# Patient Record
Sex: FEMALE | Race: WHITE | Employment: OTHER | URBAN - METROPOLITAN AREA
[De-identification: names, ages, dates, MRNs, and addresses within clinical notes are randomized per-mention and may not be internally consistent; named-entity substitution may affect disease eponyms.]

---

## 2018-01-17 PROBLEM — R06.00 DYSPNEA: Status: ACTIVE | Noted: 2018-01-17

## 2018-03-15 ENCOUNTER — TELEPHONE (OUTPATIENT)
Dept: CARDIOLOGY CLINIC | Age: 78
End: 2018-03-15

## 2018-03-15 NOTE — TELEPHONE ENCOUNTER
I informed pt. That we don't have any ranexa samples at this time and we don't know when we will be getting some, pt.  Said she will call again in a week

## 2018-03-22 ENCOUNTER — TELEPHONE (OUTPATIENT)
Dept: CARDIOLOGY CLINIC | Age: 78
End: 2018-03-22

## 2018-03-22 RX ORDER — RANOLAZINE 500 MG/1
500 TABLET, EXTENDED RELEASE ORAL 2 TIMES DAILY
Qty: 56 TABLET | Refills: 3 | COMMUNITY
Start: 2018-03-22 | End: 2018-09-07 | Stop reason: SDUPTHER

## 2018-04-13 ENCOUNTER — OFFICE VISIT (OUTPATIENT)
Dept: CARDIOLOGY CLINIC | Age: 78
End: 2018-04-13
Payer: MEDICARE

## 2018-04-13 VITALS
HEART RATE: 51 BPM | RESPIRATION RATE: 14 BRPM | WEIGHT: 140 LBS | BODY MASS INDEX: 27.48 KG/M2 | HEIGHT: 60 IN | SYSTOLIC BLOOD PRESSURE: 128 MMHG | DIASTOLIC BLOOD PRESSURE: 68 MMHG

## 2018-04-13 DIAGNOSIS — I25.10 CORONARY ARTERY DISEASE INVOLVING NATIVE CORONARY ARTERY OF NATIVE HEART WITHOUT ANGINA PECTORIS: Primary | Chronic | ICD-10-CM

## 2018-04-13 PROCEDURE — 93000 ELECTROCARDIOGRAM COMPLETE: CPT | Performed by: INTERNAL MEDICINE

## 2018-04-13 PROCEDURE — 99213 OFFICE O/P EST LOW 20 MIN: CPT | Performed by: INTERNAL MEDICINE

## 2018-04-13 RX ORDER — POTASSIUM CHLORIDE 750 MG/1
20 TABLET, FILM COATED, EXTENDED RELEASE ORAL DAILY
COMMUNITY
End: 2019-05-31

## 2018-04-13 RX ORDER — POTASSIUM CHLORIDE 20 MEQ/1
TABLET, EXTENDED RELEASE ORAL
Qty: 30 TABLET | Refills: 3 | Status: SHIPPED | OUTPATIENT
Start: 2018-04-13 | End: 2018-04-13

## 2018-04-13 RX ORDER — FUROSEMIDE 20 MG/1
TABLET ORAL
Qty: 60 TABLET | Refills: 3 | Status: SHIPPED | OUTPATIENT
Start: 2018-04-13 | End: 2019-01-14

## 2018-04-20 ENCOUNTER — TELEPHONE (OUTPATIENT)
Dept: CARDIOLOGY CLINIC | Age: 78
End: 2018-04-20

## 2018-04-20 ENCOUNTER — HOSPITAL ENCOUNTER (OUTPATIENT)
Age: 78
Discharge: HOME | End: 2018-04-20
Payer: MEDICARE

## 2018-04-20 DIAGNOSIS — I25.10 CORONARY ARTERY DISEASE INVOLVING NATIVE CORONARY ARTERY OF NATIVE HEART WITHOUT ANGINA PECTORIS: Chronic | ICD-10-CM

## 2018-04-20 LAB
ANION GAP SERPL CALCULATED.3IONS-SCNC: 12 MMOL/L (ref 7–16)
BUN BLDV-MCNC: 17 MG/DL (ref 8–23)
CALCIUM SERPL-MCNC: 9.2 MG/DL (ref 8.6–10.2)
CHLORIDE BLD-SCNC: 104 MMOL/L (ref 98–107)
CO2: 25 MMOL/L (ref 22–29)
CREAT SERPL-MCNC: 0.9 MG/DL (ref 0.5–1)
GFR AFRICAN AMERICAN: >60
GFR NON-AFRICAN AMERICAN: >60 ML/MIN/1.73
GLUCOSE BLD-MCNC: 118 MG/DL (ref 74–109)
POTASSIUM SERPL-SCNC: 4 MMOL/L (ref 3.5–5)
SODIUM BLD-SCNC: 141 MMOL/L (ref 132–146)

## 2018-04-20 PROCEDURE — 80048 BASIC METABOLIC PNL TOTAL CA: CPT

## 2018-04-20 PROCEDURE — 36415 COLL VENOUS BLD VENIPUNCTURE: CPT

## 2018-04-20 RX ORDER — RANOLAZINE 500 MG/1
500 TABLET, EXTENDED RELEASE ORAL 2 TIMES DAILY
Qty: 42 TABLET | Refills: 0 | COMMUNITY
Start: 2018-04-20 | End: 2018-09-07 | Stop reason: SDUPTHER

## 2018-04-25 ENCOUNTER — OFFICE VISIT (OUTPATIENT)
Dept: SURGERY | Age: 78
End: 2018-04-25
Payer: MEDICARE

## 2018-04-25 VITALS
TEMPERATURE: 97.7 F | RESPIRATION RATE: 16 BRPM | WEIGHT: 139 LBS | HEART RATE: 54 BPM | BODY MASS INDEX: 27.29 KG/M2 | OXYGEN SATURATION: 96 % | HEIGHT: 60 IN | DIASTOLIC BLOOD PRESSURE: 64 MMHG | SYSTOLIC BLOOD PRESSURE: 119 MMHG

## 2018-04-25 DIAGNOSIS — K58.0 IRRITABLE BOWEL SYNDROME WITH DIARRHEA: Primary | ICD-10-CM

## 2018-04-25 PROCEDURE — 99214 OFFICE O/P EST MOD 30 MIN: CPT | Performed by: SURGERY

## 2018-04-25 RX ORDER — DICYCLOMINE HYDROCHLORIDE 10 MG/1
10 CAPSULE ORAL 4 TIMES DAILY
Qty: 120 CAPSULE | Refills: 3 | Status: SHIPPED | OUTPATIENT
Start: 2018-04-25 | End: 2018-08-16 | Stop reason: ALTCHOICE

## 2018-05-14 ENCOUNTER — TELEPHONE (OUTPATIENT)
Dept: CARDIOLOGY CLINIC | Age: 78
End: 2018-05-14

## 2018-05-14 RX ORDER — RANOLAZINE 500 MG/1
500 TABLET, EXTENDED RELEASE ORAL 2 TIMES DAILY
Qty: 56 TABLET | Refills: 0 | COMMUNITY
Start: 2018-05-14 | End: 2018-09-07 | Stop reason: SDUPTHER

## 2018-05-14 RX ORDER — CLOPIDOGREL BISULFATE 75 MG/1
75 TABLET ORAL DAILY
Qty: 90 TABLET | Refills: 3 | Status: SHIPPED | OUTPATIENT
Start: 2018-05-14 | End: 2018-09-07 | Stop reason: ALTCHOICE

## 2018-06-14 ENCOUNTER — TELEPHONE (OUTPATIENT)
Dept: CARDIOLOGY CLINIC | Age: 78
End: 2018-06-14

## 2018-06-14 RX ORDER — RANOLAZINE 500 MG/1
500 TABLET, EXTENDED RELEASE ORAL 2 TIMES DAILY
Qty: 28 TABLET | Refills: 0 | COMMUNITY
Start: 2018-06-14 | End: 2018-09-07 | Stop reason: SDUPTHER

## 2018-07-03 ENCOUNTER — TELEPHONE (OUTPATIENT)
Dept: ADMINISTRATIVE | Age: 78
End: 2018-07-03

## 2018-07-03 ENCOUNTER — TELEPHONE (OUTPATIENT)
Dept: CARDIOLOGY CLINIC | Age: 78
End: 2018-07-03

## 2018-07-03 RX ORDER — AMLODIPINE BESYLATE 5 MG/1
5 TABLET ORAL NIGHTLY
Qty: 90 TABLET | Refills: 3 | Status: SHIPPED | OUTPATIENT
Start: 2018-07-03 | End: 2019-06-18 | Stop reason: SDUPTHER

## 2018-07-03 RX ORDER — RANOLAZINE 500 MG/1
500 TABLET, EXTENDED RELEASE ORAL 2 TIMES DAILY
Qty: 28 TABLET | Refills: 0 | COMMUNITY
Start: 2018-07-03 | End: 2018-09-07 | Stop reason: SDUPTHER

## 2018-07-19 ENCOUNTER — TELEPHONE (OUTPATIENT)
Dept: CARDIOLOGY CLINIC | Age: 78
End: 2018-07-19

## 2018-07-20 RX ORDER — RANOLAZINE 500 MG/1
500 TABLET, EXTENDED RELEASE ORAL 2 TIMES DAILY
Qty: 42 TABLET | Refills: 0 | COMMUNITY
Start: 2018-07-20 | End: 2018-09-07 | Stop reason: SDUPTHER

## 2018-07-31 ENCOUNTER — OFFICE VISIT (OUTPATIENT)
Dept: ENT CLINIC | Age: 78
End: 2018-07-31
Payer: MEDICARE

## 2018-07-31 VITALS
BODY MASS INDEX: 25.91 KG/M2 | HEIGHT: 60 IN | WEIGHT: 132 LBS | DIASTOLIC BLOOD PRESSURE: 57 MMHG | SYSTOLIC BLOOD PRESSURE: 114 MMHG | HEART RATE: 50 BPM | OXYGEN SATURATION: 96 %

## 2018-07-31 DIAGNOSIS — J30.1 CHRONIC SEASONAL ALLERGIC RHINITIS DUE TO POLLEN: ICD-10-CM

## 2018-07-31 DIAGNOSIS — J32.4 CHRONIC PANSINUSITIS: Primary | ICD-10-CM

## 2018-07-31 PROCEDURE — 99204 OFFICE O/P NEW MOD 45 MIN: CPT | Performed by: OTOLARYNGOLOGY

## 2018-07-31 ASSESSMENT — ENCOUNTER SYMPTOMS
EYE DISCHARGE: 0
EYE PAIN: 1
ABDOMINAL PAIN: 0
EYE ITCHING: 0
SINUS PAIN: 1
RHINORRHEA: 1
COLOR CHANGE: 0
RESPIRATORY NEGATIVE: 1
FACIAL SWELLING: 1
VOICE CHANGE: 1
SHORTNESS OF BREATH: 0
GASTROINTESTINAL NEGATIVE: 1
SINUS PRESSURE: 1

## 2018-07-31 NOTE — PROGRESS NOTES
Subjective:      Patient ID:  Lisha Garcia is a 77 y.o. female. HPI:    Patient presents today for \"lifelong green mucus\" that she is constantly clearing from her thoat. She had Left sided sinus surgery in 2014, which had no effect on her condition. Since that procedure she has daily Left sided epiphora that she is concerned about. She complains of decreased sense of smell. She is using a sinus flush BID, which temporarily helps her PND. Her left sided face pressure and fullness is worsening and she now hears her left ear popping. She feels that she has visible face fullness on the left at times. She very inconsistently uses Flonase. She has b/l tinnitus that she wears masking hearing aids for. She was a patient of Dr. Servando Ku, but desires to now establish care elsewhere. Patient's medications, allergies, past medical, surgical, social and family histories were reviewed and updated as appropriate. Review of Systems   Constitutional: Negative. Negative for fatigue, fever and unexpected weight change. HENT: Positive for congestion, ear pain, facial swelling, postnasal drip, rhinorrhea, sinus pain, sinus pressure, tinnitus and voice change. Negative for ear discharge. Eyes: Positive for pain. Negative for discharge, itching and visual disturbance. Respiratory: Negative. Negative for shortness of breath. Cardiovascular: Negative. Negative for chest pain. Gastrointestinal: Negative. Negative for abdominal pain. Genitourinary: Negative. Musculoskeletal: Negative. Skin: Negative. Negative for color change. Neurological: Negative. Psychiatric/Behavioral: Negative. Negative for behavioral problems and hallucinations. All other systems reviewed and are negative. Objective:   Physical Exam   Constitutional: She is oriented to person, place, and time. She appears well-developed and well-nourished. No distress. HENT:   Head: Normocephalic and atraumatic. Right Ear: Hearing, tympanic membrane, external ear and ear canal normal.   Left Ear: Hearing, tympanic membrane, external ear and ear canal normal.   Nose: Mucosal edema and rhinorrhea present. Mouth/Throat: Uvula is midline, oropharynx is clear and moist and mucous membranes are normal.   Mild PND w/o cobblestoning. Mild pale green congestion from middle turb. Septum midline. Mucosa pink and moist. Voice consistent with congestion. Eyes: Conjunctivae and EOM are normal. Pupils are equal, round, and reactive to light. Right eye exhibits no discharge. Left eye exhibits no discharge. Neck: Normal range of motion. Neck supple. No tracheal deviation present. No thyromegaly present. Cardiovascular: Normal rate, regular rhythm and normal heart sounds. Pulmonary/Chest: Effort normal and breath sounds normal.   Abdominal: Soft. Bowel sounds are normal.   Lymphadenopathy:     She has no cervical adenopathy. Neurological: She is alert and oriented to person, place, and time. Skin: Skin is warm and dry. Nursing note and vitals reviewed. Assessment:       Diagnosis Orders   1. Chronic pansinusitis     2. Chronic seasonal allergic rhinitis due to pollen                Plan:      Pt has been educated on flonase use. She is to use flonase daily two sprays each side before bed after she uses her sinus rinse. She does not need a script for flonase today. She is to follow up in one month; will consider CT sinuses w/o contrast and/or abx then if sxs persist.     1 month f/u    [facility] OMS-IV                     Fuad Castañeda  1940    I have discussed the case, including pertinent history and exam findings with the student. I have seen and examined the patient and the key elements of the encounter have been performed by me. I agree with the assessment, plan and orders as documented by the  student    Patient is here to establish care as a new patient in the clinic.            Remainder

## 2018-08-13 ENCOUNTER — TELEPHONE (OUTPATIENT)
Dept: CARDIOLOGY CLINIC | Age: 78
End: 2018-08-13

## 2018-08-13 RX ORDER — RANOLAZINE 500 MG/1
500 TABLET, EXTENDED RELEASE ORAL 2 TIMES DAILY
Qty: 56 TABLET | Refills: 0 | COMMUNITY
Start: 2018-08-13 | End: 2018-09-07 | Stop reason: SDUPTHER

## 2018-08-14 ENCOUNTER — HOSPITAL ENCOUNTER (OUTPATIENT)
Age: 78
Discharge: HOME | End: 2018-08-16
Payer: MEDICARE

## 2018-08-14 PROCEDURE — 87088 URINE BACTERIA CULTURE: CPT

## 2018-08-16 ENCOUNTER — OFFICE VISIT (OUTPATIENT)
Dept: PULMONOLOGY | Age: 78
End: 2018-08-16
Payer: MEDICARE

## 2018-08-16 VITALS
SYSTOLIC BLOOD PRESSURE: 129 MMHG | DIASTOLIC BLOOD PRESSURE: 63 MMHG | HEIGHT: 60 IN | HEART RATE: 55 BPM | BODY MASS INDEX: 26.5 KG/M2 | TEMPERATURE: 98.7 F | WEIGHT: 135 LBS

## 2018-08-16 DIAGNOSIS — R06.00 DYSPNEA, UNSPECIFIED TYPE: ICD-10-CM

## 2018-08-16 LAB — URINE CULTURE, ROUTINE: NORMAL

## 2018-08-16 PROCEDURE — 99213 OFFICE O/P EST LOW 20 MIN: CPT | Performed by: INTERNAL MEDICINE

## 2018-08-16 RX ORDER — NITROFURANTOIN 25; 75 MG/1; MG/1
CAPSULE ORAL
Refills: 0 | COMMUNITY
Start: 2018-08-14 | End: 2018-09-07

## 2018-08-16 NOTE — PROGRESS NOTES
Department of Internal Medicine                                    Division of Pulmonary, Critical Care & Sleep Medicine                                           Pulmonary [address] [facility]                       Pulmonary Clinic Consult     I had the pleasure of seeing  Judith Pruitt in the [facility] regarding their bronchiectasis and chronic sinusitis      Chief Complaint   Patient presents with    Follow-up     6 MONTHS,     Shortness of Breath    Cough    sinus drainage  Cough with green sputum    HISTORY OF PRESENT ILLNESS:    Judith Pruitt is a 77y.o. year old female extremely pleasant who started smoking at the age of 15 one pack daily for 30 year and then she quit. She had significant sinus problem for which she has been following follow-up last years and she had sinus surgery without significant improvement in her symptoms. 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 She was treated with IVIG by Dr. Luis Lopez and the last level still low also she had cultures of her sputum showing Pseudomonas and also she states she received IV abx  treatment for that. Her sinus surgery did not show any granuloma. She had also workup for autoimmune disease where she had been a negative rheumatoid factor negative with slight, Hypogammaglobulinemia, IgE level was normal and sed rate was 25 with CRP 5.5 . Patient was tried in tobramycin inhaler because she developed hoarseness after  the use. Seen by ENT without any significant issue in the vocal cord  She was also treated with Zithromax without any significant improvement by her family doctor.     The patient also uses Flonase for high sinusitis, seeing an daily tablet Take 1 tablet by mouth daily 90 tablet 3    nadolol (CORGARD) 80 MG tablet Take 1 tablet by mouth daily 90 tablet 3    albuterol sulfate HFA (PROAIR HFA) 108 (90 Base) MCG/ACT inhaler Inhale 2 puffs into the lungs every 6 hours as needed for Wheezing 1 Inhaler 3    nitroGLYCERIN (NITROSTAT) 0.4 MG SL tablet up to max of 3 total doses. If no relief after 1 dose, call 911. 25 tablet 3    Magnesium 100 MG CAPS Take 250 mg by mouth daily       rosuvastatin (CRESTOR) 5 MG tablet Take 5 mg by mouth       Lysine 1000 MG TABS Take by mouth daily      benzonatate (TESSALON) 100 MG capsule       hydrochlorothiazide (HYDRODIURIL) 12.5 MG tablet Take 12.5 mg by mouth daily       Multiple Vitamins-Minerals (THERAPEUTIC MULTIVITAMIN-MINERALS) tablet Take 1 tablet by mouth daily      Cholecalciferol (VITAMIN D) 2000 UNITS CAPS capsule Take 10,000 capsules by mouth daily       thyroid (ARMOUR THYROID) 60 MG tablet Take 60 mg by mouth daily.  Biotin 5000 MCG CAPS Take  by mouth daily.  Coenzyme Q10 (CO Q 10 PO) Take 200 mg by mouth daily.  OMEGA 3 1000 MG CAPS Take  by mouth daily.         nitrofurantoin, macrocrystal-monohydrate, (MACROBID) 100 MG capsule TAKE ONE CAPSULE BY MOUTH TWO TIMES A DAY  0    ranolazine (RANEXA) 500 MG extended release tablet Take 1 tablet by mouth 2 times daily for 1 day 56 tablet 0    ranolazine (RANEXA) 500 MG extended release tablet Take 1 tablet by mouth 2 times daily for 1 day [id number]/20 42 tablet 0    ranolazine (RANEXA) 500 MG extended release tablet Take 1 tablet by mouth 2 times daily for 1 day 28 tablet 0    ranolazine (RANEXA) 500 MG extended release tablet Take 1 tablet by mouth 2 times daily for 1 day 28 tablet 0    ranolazine (RANEXA) 500 MG extended release tablet Take 1 tablet by mouth 2 times daily for 1 day 56 tablet 0    ranolazine (RANEXA) 500 MG extended release tablet Take 1 tablet by mouth 2 times daily for 1 day 42 tablet 0    weight changes. No fevers, fatigue or weakness. Head: Patient denies any history of trauma, convulsive disorder or syncope. Skin:  Patient denies history of changes in pigmentation, eruptions or pruritus. No easy bruising or bleeding. EENT: nasal congestion ,severe erythema   Cardiovascular:   SOB ,No edema ,No chest pain   Respiration: SOB ,no ALVAREZ   Gastrointestinal:No GI bleed ,no melena  ,no hematemesis    Musculoskeletal: no joint pain ,no swelling  Neurological:no , syncope. Denies twitching, convulsions, loss of consciousness or memory. Endocrine:  . No history of goiter, exophthalmos or dryness of skin. The patient has no history of diabetes. Hematopoietic:  No history of bleeding disorders or easy bruising. Rheumatic:  No connective tissue disease history or polyarthritis/inflammatory joint disease. PHYSICAL EXAMINATION:  Vitals:    08/16/18 1314   BP: 129/63   Pulse: 55   Temp: 98.7 °F (37.1 °C)     Constitutional: This is a well developed, well nourished 77y.o. year old female who is alert, oriented, cooperative and in no apparent distress. Head was normocephalic and atraumatic. EENT: Eyes were equal and reactive to light and accommodation. Extraocular muscles intact. No conjunctival injections. External canals are patent and no discharge was appreciated. Septum was midline, mucosa was without erythema, exudates or cobblestoning. No thrush was noted. Neck: Supple without thyromegaly. No elevated JVP. Trachea was midline. No carotid bruits were auscultated. Respiratory: rhonchi and  Crackles bibasilar     Cardiovascular: Regular without murmur, clicks, gallops or rubs. There is no left or right ventricular heave. Pulses:  Carotid, radial and femoral pulses were equally bilaterally. Abdomen: Slightly rounded and soft without organomegaly. No rebound, rigidity or guarding was appreciated. Lymphatic: No lymphadenopathy.   Musculoskeletal: no Joint deformity

## 2018-09-07 ENCOUNTER — OFFICE VISIT (OUTPATIENT)
Dept: ENT CLINIC | Age: 78
End: 2018-09-07
Payer: MEDICARE

## 2018-09-07 VITALS
OXYGEN SATURATION: 90 % | DIASTOLIC BLOOD PRESSURE: 61 MMHG | HEIGHT: 60 IN | BODY MASS INDEX: 25.52 KG/M2 | WEIGHT: 130 LBS | HEART RATE: 47 BPM | SYSTOLIC BLOOD PRESSURE: 121 MMHG

## 2018-09-07 DIAGNOSIS — J30.1 CHRONIC SEASONAL ALLERGIC RHINITIS DUE TO POLLEN: ICD-10-CM

## 2018-09-07 DIAGNOSIS — J32.4 CHRONIC PANSINUSITIS: Primary | ICD-10-CM

## 2018-09-07 PROCEDURE — 99213 OFFICE O/P EST LOW 20 MIN: CPT | Performed by: OTOLARYNGOLOGY

## 2018-09-07 ASSESSMENT — ENCOUNTER SYMPTOMS
EYE ITCHING: 0
VOICE CHANGE: 1
ABDOMINAL PAIN: 0
SINUS PAIN: 1
SHORTNESS OF BREATH: 0
EYE DISCHARGE: 0
FACIAL SWELLING: 1
GASTROINTESTINAL NEGATIVE: 1
RHINORRHEA: 1
COLOR CHANGE: 0
RESPIRATORY NEGATIVE: 1
SINUS PRESSURE: 1
EYE PAIN: 1

## 2018-09-07 NOTE — PROGRESS NOTES
clear and moist and mucous membranes are normal.   Mild PND w/o cobblestoning. Mild pale green congestion from middle turb. Septum midline. Mucosa pink and moist. Voice consistent with congestion. Eyes: Pupils are equal, round, and reactive to light. Conjunctivae and EOM are normal. Right eye exhibits no discharge. Left eye exhibits no discharge. Neck: Normal range of motion. Neck supple. No tracheal deviation present. No thyromegaly present. Cardiovascular: Normal rate, regular rhythm and normal heart sounds. Pulmonary/Chest: Effort normal and breath sounds normal.   Abdominal: Soft. Bowel sounds are normal.   Lymphadenopathy:     She has no cervical adenopathy. Neurological: She is alert and oriented to person, place, and time. Skin: Skin is warm and dry. Nursing note and vitals reviewed. Assessment:       Diagnosis Orders   1. Chronic pansinusitis     2. Chronic seasonal allergic rhinitis due to pollen                Plan:      Allergic rhinitis   I do want to continue fluticasone (Flonase)    for now. I do not want the patient to try Mucinex as well, take two 600 mg pills twice a day. I will also order a CT scan of the Sinuses due to the continued symptoms of the patient despite medical therapy. Call or return to clinic prn if these symptoms worsen or fail to improve as anticipated.     Pt has had previous surgery by Dr. Servando Ku  Follow up in 2 week(s)

## 2018-09-10 ENCOUNTER — TELEPHONE (OUTPATIENT)
Dept: ADMINISTRATIVE | Age: 78
End: 2018-09-10

## 2018-09-10 ENCOUNTER — TELEPHONE (OUTPATIENT)
Dept: ENT CLINIC | Age: 78
End: 2018-09-10

## 2018-09-10 ENCOUNTER — TELEPHONE (OUTPATIENT)
Dept: CARDIOLOGY CLINIC | Age: 78
End: 2018-09-10

## 2018-09-10 RX ORDER — RANOLAZINE 500 MG/1
500 TABLET, EXTENDED RELEASE ORAL 2 TIMES DAILY
Qty: 56 TABLET | Refills: 0 | COMMUNITY
Start: 2018-09-10 | End: 2019-01-14

## 2018-09-10 NOTE — TELEPHONE ENCOUNTER
Pt called in response to vm that she received. She stated she did not get the whole message. I read her the message Milena Bennett typed in chart regarding sinus CT. Pt states she cannot do morning on that day but can do after 11 am.  I called the office but received the vm. Pt was transferred to leave a msg for the office to get back to her on resched CT.

## 2018-09-11 RX ORDER — NADOLOL 80 MG/1
80 TABLET ORAL DAILY
Qty: 90 TABLET | Refills: 3 | Status: SHIPPED | OUTPATIENT
Start: 2018-09-11 | End: 2018-09-11 | Stop reason: SDUPTHER

## 2018-09-12 RX ORDER — NADOLOL 80 MG/1
80 TABLET ORAL DAILY
Qty: 90 TABLET | Refills: 3 | Status: SHIPPED | OUTPATIENT
Start: 2018-09-12 | End: 2018-10-30 | Stop reason: ALTCHOICE

## 2018-09-20 ENCOUNTER — HOSPITAL ENCOUNTER (OUTPATIENT)
Dept: CT IMAGING | Age: 78
Discharge: HOME | End: 2018-09-22
Payer: MEDICARE

## 2018-09-20 DIAGNOSIS — J32.4 CHRONIC PANSINUSITIS: ICD-10-CM

## 2018-09-20 PROCEDURE — 70486 CT MAXILLOFACIAL W/O DYE: CPT

## 2018-09-28 ASSESSMENT — ENCOUNTER SYMPTOMS
EYE ITCHING: 0
CONSTIPATION: 0
ABDOMINAL PAIN: 0
SORE THROAT: 0
SHORTNESS OF BREATH: 0
VOICE CHANGE: 0
RHINORRHEA: 0
NAUSEA: 0
CHEST TIGHTNESS: 0
BACK PAIN: 0
EYE DISCHARGE: 0
COUGH: 0
SINUS PRESSURE: 0
BLOOD IN STOOL: 0
SINUS PAIN: 0
TROUBLE SWALLOWING: 0
DIARRHEA: 0
CHOKING: 0
ABDOMINAL DISTENTION: 0
VOMITING: 0
WHEEZING: 0

## 2018-10-08 ENCOUNTER — TELEPHONE (OUTPATIENT)
Dept: CARDIOLOGY CLINIC | Age: 78
End: 2018-10-08

## 2018-10-08 RX ORDER — RANOLAZINE 500 MG/1
500 TABLET, EXTENDED RELEASE ORAL 2 TIMES DAILY
Qty: 56 TABLET | Refills: 0 | COMMUNITY
Start: 2018-10-08 | End: 2019-01-14

## 2018-10-09 ENCOUNTER — OFFICE VISIT (OUTPATIENT)
Dept: ENT CLINIC | Age: 78
End: 2018-10-09
Payer: MEDICARE

## 2018-10-09 VITALS
DIASTOLIC BLOOD PRESSURE: 57 MMHG | HEIGHT: 60 IN | BODY MASS INDEX: 25.52 KG/M2 | WEIGHT: 130 LBS | SYSTOLIC BLOOD PRESSURE: 114 MMHG | OXYGEN SATURATION: 90 % | HEART RATE: 56 BPM

## 2018-10-09 DIAGNOSIS — J30.1 CHRONIC SEASONAL ALLERGIC RHINITIS DUE TO POLLEN: ICD-10-CM

## 2018-10-09 DIAGNOSIS — J32.4 CHRONIC PANSINUSITIS: Primary | ICD-10-CM

## 2018-10-09 PROCEDURE — 99213 OFFICE O/P EST LOW 20 MIN: CPT | Performed by: OTOLARYNGOLOGY

## 2018-10-09 RX ORDER — AZELASTINE 1 MG/ML
1 SPRAY, METERED NASAL 2 TIMES DAILY
Qty: 1 BOTTLE | Refills: 3 | Status: SHIPPED | OUTPATIENT
Start: 2018-10-09

## 2018-10-09 ASSESSMENT — ENCOUNTER SYMPTOMS
ALLERGIC/IMMUNOLOGIC NEGATIVE: 1
RHINORRHEA: 1
SHORTNESS OF BREATH: 0
EYE DISCHARGE: 1
COUGH: 1
SINUS PRESSURE: 1

## 2018-10-15 ENCOUNTER — OFFICE VISIT (OUTPATIENT)
Dept: BREAST CENTER | Age: 78
End: 2018-10-15
Payer: MEDICARE

## 2018-10-15 VITALS
HEART RATE: 54 BPM | DIASTOLIC BLOOD PRESSURE: 70 MMHG | TEMPERATURE: 98.2 F | BODY MASS INDEX: 26.25 KG/M2 | OXYGEN SATURATION: 95 % | WEIGHT: 133.7 LBS | SYSTOLIC BLOOD PRESSURE: 138 MMHG | RESPIRATION RATE: 16 BRPM | HEIGHT: 60 IN

## 2018-10-15 DIAGNOSIS — Z01.419 WELL WOMAN EXAM: ICD-10-CM

## 2018-10-15 DIAGNOSIS — Z12.31 VISIT FOR SCREENING MAMMOGRAM: Primary | ICD-10-CM

## 2018-10-15 DIAGNOSIS — N64.4 BREAST PAIN: ICD-10-CM

## 2018-10-15 PROCEDURE — 99203 OFFICE O/P NEW LOW 30 MIN: CPT | Performed by: NURSE PRACTITIONER

## 2018-10-15 PROCEDURE — 99204 OFFICE O/P NEW MOD 45 MIN: CPT | Performed by: NURSE PRACTITIONER

## 2018-10-18 ENCOUNTER — TELEPHONE (OUTPATIENT)
Dept: BREAST CENTER | Age: 78
End: 2018-10-18

## 2018-10-30 ENCOUNTER — OFFICE VISIT (OUTPATIENT)
Dept: CARDIOLOGY CLINIC | Age: 78
End: 2018-10-30
Payer: MEDICARE

## 2018-10-30 VITALS
BODY MASS INDEX: 25.9 KG/M2 | WEIGHT: 131.9 LBS | HEIGHT: 60 IN | HEART RATE: 45 BPM | RESPIRATION RATE: 16 BRPM | SYSTOLIC BLOOD PRESSURE: 122 MMHG | DIASTOLIC BLOOD PRESSURE: 62 MMHG

## 2018-10-30 DIAGNOSIS — I25.119 CORONARY ARTERY DISEASE INVOLVING NATIVE HEART WITH ANGINA PECTORIS, UNSPECIFIED VESSEL OR LESION TYPE (HCC): Primary | Chronic | ICD-10-CM

## 2018-10-30 PROCEDURE — 93000 ELECTROCARDIOGRAM COMPLETE: CPT | Performed by: INTERNAL MEDICINE

## 2018-10-30 PROCEDURE — 99214 OFFICE O/P EST MOD 30 MIN: CPT | Performed by: INTERNAL MEDICINE

## 2018-10-30 RX ORDER — ISOSORBIDE MONONITRATE 30 MG/1
30 TABLET, EXTENDED RELEASE ORAL DAILY
Qty: 90 TABLET | Refills: 3 | Status: SHIPPED | OUTPATIENT
Start: 2018-10-30 | End: 2018-11-29 | Stop reason: SDUPTHER

## 2018-10-30 RX ORDER — RANOLAZINE 500 MG/1
500 TABLET, EXTENDED RELEASE ORAL 2 TIMES DAILY
Qty: 56 TABLET | Refills: 0 | COMMUNITY
Start: 2018-10-30 | End: 2019-01-14

## 2018-10-30 RX ORDER — ASPIRIN 81 MG/1
81 TABLET ORAL DAILY
Qty: 30 TABLET | Refills: 3 | Status: SHIPPED | OUTPATIENT
Start: 2018-10-30

## 2018-10-30 RX ORDER — METOPROLOL SUCCINATE 25 MG/1
25 TABLET, EXTENDED RELEASE ORAL 2 TIMES DAILY
Qty: 60 TABLET | Refills: 3 | Status: SHIPPED | OUTPATIENT
Start: 2018-10-30 | End: 2018-11-06 | Stop reason: SINTOL

## 2018-10-30 NOTE — PROGRESS NOTES
(11/13/14)- saw [place] for yearly visit 3/2014    Cataract     bilaterally    Gastritis     GERD (gastroesophageal reflux disease)     Heart block 2004    2 heart stents inserted    Hyperlipidemia     denies     Prolonged emergence from general anesthesia     Thyroid disease     hypo    Vitamin D deficiency        Past Surgical History:  Past Surgical History:   Procedure Laterality Date    BRONCHOSCOPY N/A 11/18/14    BRONCHOSCOPY BAL & BRUSHING    CARDIAC CATHETERIZATION  2008    CATARACT REMOVAL Bilateral 2006    COLONOSCOPY  2006    neg    COLONOSCOPY  10/23/2015    CORONARY ANGIOPLASTY WITH STENT PLACEMENT  2004    2 stents    ENDOSCOPY, COLON, DIAGNOSTIC      ENDOSCOPY, COLON, DIAGNOSTIC  10/23/2015    HAND TENDON SURGERY  2003    left thumb    JOINT REPLACEMENT Right 2012    knee    KNEE ARTHROSCOPY  2005    right    KNEE SURGERY  5/14/2012    right knee replacement    NOSE SURGERY  2014    sinus    OTHER SURGICAL HISTORY Left     thumb arthritis surgery    UPPER GASTROINTESTINAL ENDOSCOPY  2011    neg       Family History:  Family History   Problem Relation Age of Onset    Cancer Paternal Grandfather         stomach    Diabetes Maternal Grandmother     Other Father         no clinical problems dies at age 80 years   [facility] Stroke Mother     Osteoporosis Mother     Alzheimer's Disease Brother         in nursing home       Social History:  Social History     Social History    Marital status:      Spouse name: N/A    Number of children: N/A    Years of education: N/A     Occupational History    Not on file. Social History Main Topics    Smoking status: Former Smoker     Packs/day: 1.00     Years: 25.00     Quit date: 10/19/1984    Smokeless tobacco: Never Used    Alcohol use No    Drug use: No    Sexual activity: Not Currently     Other Topics Concern    Not on file     Social History Narrative    No narrative on file       Allergies:   Allergies   Allergen

## 2018-11-06 ENCOUNTER — TELEPHONE (OUTPATIENT)
Dept: CARDIOLOGY CLINIC | Age: 78
End: 2018-11-06

## 2018-11-06 RX ORDER — NADOLOL 40 MG/1
40 TABLET ORAL DAILY
COMMUNITY
End: 2018-11-26 | Stop reason: SDUPTHER

## 2018-11-06 NOTE — TELEPHONE ENCOUNTER
Patient called stating her Nadolol was changed to Metoprolol and since then she has been experiencing angina, shortness of breath and she is completely exhausted. Please advise.

## 2018-11-07 ENCOUNTER — TELEPHONE (OUTPATIENT)
Dept: PULMONOLOGY | Age: 78
End: 2018-11-07

## 2018-11-07 NOTE — TELEPHONE ENCOUNTER
Returned patient's call. Patient left a VM during the lunch hour. Patient wanted to know if Dr. Panda Ramirez had an earlier appointment than her appointment of November 20th. Told patient that he had nothing sooner. She was wondering if anyone had cancelled. Told her sorry no.  Patient stated ok, no problem and that she will just see him on 11/20/18 at 1:15 PM.

## 2018-11-20 ENCOUNTER — TELEPHONE (OUTPATIENT)
Dept: PULMONOLOGY | Age: 78
End: 2018-11-20

## 2018-11-20 ENCOUNTER — OFFICE VISIT (OUTPATIENT)
Dept: PULMONOLOGY | Age: 78
End: 2018-11-20
Payer: MEDICARE

## 2018-11-20 VITALS
WEIGHT: 132 LBS | HEIGHT: 60 IN | SYSTOLIC BLOOD PRESSURE: 138 MMHG | BODY MASS INDEX: 25.91 KG/M2 | DIASTOLIC BLOOD PRESSURE: 64 MMHG | OXYGEN SATURATION: 83 % | HEART RATE: 50 BPM | TEMPERATURE: 97.7 F | RESPIRATION RATE: 20 BRPM

## 2018-11-20 DIAGNOSIS — R06.00 DYSPNEA, UNSPECIFIED TYPE: ICD-10-CM

## 2018-11-20 DIAGNOSIS — J47.1 BRONCHIECTASIS WITH ACUTE EXACERBATION (HCC): Primary | ICD-10-CM

## 2018-11-20 LAB
EXPIRATORY TIME: 7.01 SEC
FEF 25-75% %PRED-PRE: NORMAL L/SEC
FEF 25-75% PRED: 1.44 L/SEC
FEF 25-75%-PRE: 2.17 L/SEC
FEV1 %PRED-PRE: NORMAL %
FEV1 PRED: 1.7 L
FEV1/FVC %PRED-PRE: NORMAL %
FEV1/FVC PRED: 78 %
FEV1/FVC: 85 %
FEV1: 1.8 L
FVC %PRED-PRE: NORMAL %
FVC PRED: 2.21 L
FVC: 2.12 L
PEF %PRED-PRE: NORMAL L/SEC
PEF PRED: NORMAL L/SEC
PEF-PRE: NORMAL L/SEC

## 2018-11-20 PROCEDURE — 94010 BREATHING CAPACITY TEST: CPT | Performed by: INTERNAL MEDICINE

## 2018-11-20 PROCEDURE — 99214 OFFICE O/P EST MOD 30 MIN: CPT | Performed by: INTERNAL MEDICINE

## 2018-11-20 PROCEDURE — 99213 OFFICE O/P EST LOW 20 MIN: CPT | Performed by: INTERNAL MEDICINE

## 2018-11-20 RX ORDER — LEVOFLOXACIN 500 MG/1
500 TABLET, FILM COATED ORAL DAILY
Qty: 7 TABLET | Refills: 0 | Status: SHIPPED | OUTPATIENT
Start: 2018-11-20 | End: 2018-11-26 | Stop reason: ALTCHOICE

## 2018-11-20 RX ORDER — FLUCONAZOLE 100 MG/1
100 TABLET ORAL DAILY
Qty: 1 TABLET | Refills: 0 | Status: SHIPPED | OUTPATIENT
Start: 2018-11-20 | End: 2018-11-21

## 2018-11-20 RX ORDER — LEVOFLOXACIN 500 MG/1
500 TABLET, FILM COATED ORAL DAILY
Qty: 10 TABLET | Refills: 0 | Status: SHIPPED | OUTPATIENT
Start: 2018-11-20 | End: 2018-11-20 | Stop reason: SDUPTHER

## 2018-11-20 ASSESSMENT — PULMONARY FUNCTION TESTS
FVC: 2.12
FEV1: 1.80
FEV1/FVC: 85
FVC_PREDICTED: 2.21
FEV1/FVC_PREDICTED: 78
FEV1_PREDICTED: 1.70

## 2018-11-20 NOTE — PROGRESS NOTES
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   Birds :None     SURGICAL HISTORY:   Past Surgical History:   Procedure Laterality Date    BRONCHOSCOPY N/A 11/18/14    BRONCHOSCOPY BAL & BRUSHING    CARDIAC CATHETERIZATION  2008    CATARACT REMOVAL Bilateral 2006    COLONOSCOPY  2006    neg    COLONOSCOPY  10/23/2015    CORONARY ANGIOPLASTY WITH STENT PLACEMENT  2004    2 stents    ENDOSCOPY, COLON, DIAGNOSTIC      ENDOSCOPY, COLON, DIAGNOSTIC  10/23/2015    HAND TENDON SURGERY  2003    left thumb    JOINT REPLACEMENT Right 2012    knee    KNEE ARTHROSCOPY  2005    right    KNEE SURGERY  5/14/2012    right knee replacement    NOSE SURGERY  2014    sinus    OTHER SURGICAL HISTORY Left     thumb arthritis surgery    UPPER GASTROINTESTINAL ENDOSCOPY  2011    neg       FAMILY HISTORY:   Lung cancer:None  DVT or PE :None     REVIEW OF SYSTEMS:  Constitutional: General health is good . There has been no weight changes. No fevers, fatigue or weakness. Head: Patient denies any history of trauma, convulsive disorder or syncope. Skin:  Patient denies history of changes in pigmentation, eruptions or pruritus. No easy bruising or bleeding. EENT: nasal congestion ,severe erythema   Cardiovascular:   SOB ,No edema ,No chest pain   Respiration: SOB ,no ALVAREZ   Gastrointestinal:No GI bleed ,no melena  ,no hematemesis    Musculoskeletal: no joint pain ,no swelling  Neurological:no , syncope. Denies twitching, convulsions, loss of consciousness or memory. Endocrine:  . No history of goiter, exophthalmos or dryness of skin. The patient has no history of diabetes. Hematopoietic:  No history of bleeding disorders or easy bruising. Rheumatic:  No connective tissue disease history or polyarthritis/inflammatory joint disease.       PHYSICAL EXAMINATION:  Vitals: 11/20/18 1323   BP: 138/64   Pulse: 50   Resp: 20   Temp: 97.7 °F (36.5 °C)   SpO2: (!) 83%     Constitutional: This is a well developed, well nourished 77y.o. year old female who is alert, oriented, cooperative and in no apparent distress. Head was normocephalic and atraumatic. EENT: Eyes were equal and reactive to light and accommodation. Extraocular muscles intact. No conjunctival injections. External canals are patent and no discharge was appreciated. Septum was midline, mucosa was without erythema, exudates or cobblestoning. No thrush was noted. Neck: Supple without thyromegaly. No elevated JVP. Trachea was midline. No carotid bruits were auscultated. Respiratory: rhonchi and  Crackles bibasilar     Cardiovascular: Regular without murmur, clicks, gallops or rubs. There is no left or right ventricular heave. Pulses:  Carotid, radial and femoral pulses were equally bilaterally. Abdomen: Slightly rounded and soft without organomegaly. No rebound, rigidity or guarding was appreciated. Lymphatic: No lymphadenopathy. Musculoskeletal: no Joint deformity     Extremities: no edema . no cyanosis   Skin:  Warm and dry. Good color, turgor and pigmentation. No lesions or scars. Neurological/Psychiatric: The patient's general behavior, level of consciousness, thought content and emotional status is normal.  Cranial nerves II-XII are intact. DATA: Spirometry done in the office today demonstrates an FVC 2.47 of liters which is  110   % of predicted with an FEV1 of 1.92 liters which is111 % of predicted. FEV1/FVC ratio is 78  %. Mid expiratory flow rates are 1.71 % of predicted. Maximum voluntary ventilation is normal at 89  liters per minute or 121 % of predicted. Flow volume loop shows no signs of intrathoracic or extrathoracic obstruction.      Impressions: Normal spirometry    IMPRESSION:    1-Bronchiectasis  2-Severe allergic rhinitis with sinusitis  3- Hypogammaglobulinemia  4- Pseudomonas infection/colonization  5-hoarseness unknown etiology, was attributed to tobramycin inhaler use             PLAN:      -Sinusitis with  history of hypogammaglobulinemia   Does not want to undergo IVIG     3#advice patient to quit taking nonsteroidal anti-inflammatory drugs NSAIDs, also to advise to stop aspirin, she will discuss with her cardiologist    #had follow up with  ENT and she was told after treatment is surgery   #6 continue with flutter valve to help her clear her secretions   #-ANCA ngative   Allergy test negative   # send D dimer  #-send Sjogren      Levaquin 500 mg PO daily X 7 days  Then diflucan 100 mg Po daily X 1 after finish Levaquin ,not at the same   Azithromycin 250 Po every other day after finish X 4 weeks   Since she does not want IVIG and she has bronchiectasis and history of Pseudomonas < ihave no other choice by to ricki abx long and suppress the chronic inflammation and possible infection     Will start O2 on on ambulation   Her O2 drop to 84     Will do CT chest   Will see how she does next 4 weeks  No doubt challenging  case ,will consider [facility] if she agree and if still not better      Thank you for allowing me to participate in [place] care.  I will keep following with you ,should you have any concerns ,please contact me at [phone]     Sincerely,        Med Rodrigez MD  Pulmonary & Critical Care Medicine

## 2018-11-26 ENCOUNTER — TELEPHONE (OUTPATIENT)
Dept: PULMONOLOGY | Age: 78
End: 2018-11-26

## 2018-11-26 DIAGNOSIS — J47.9 BRONCHIECTASIS WITHOUT COMPLICATION (HCC): Primary | ICD-10-CM

## 2018-11-26 RX ORDER — FLUCONAZOLE 100 MG/1
TABLET ORAL
Qty: 1 TABLET | Refills: 0 | Status: SHIPPED | OUTPATIENT
Start: 2018-11-26 | End: 2018-12-07 | Stop reason: ALTCHOICE

## 2018-11-26 RX ORDER — AZITHROMYCIN 250 MG/1
TABLET, FILM COATED ORAL
Qty: 30 TABLET | Refills: 0 | Status: SHIPPED | OUTPATIENT
Start: 2018-11-26 | End: 2018-12-07 | Stop reason: ALTCHOICE

## 2018-11-26 RX ORDER — NADOLOL 40 MG/1
40 TABLET ORAL DAILY
Qty: 90 TABLET | Refills: 3 | Status: SHIPPED | OUTPATIENT
Start: 2018-11-26 | End: 2018-12-07 | Stop reason: SDUPTHER

## 2018-11-28 ENCOUNTER — HOSPITAL ENCOUNTER (OUTPATIENT)
Age: 78
Discharge: HOME | End: 2018-11-28
Payer: MEDICARE

## 2018-11-28 DIAGNOSIS — R06.00 DYSPNEA, UNSPECIFIED TYPE: ICD-10-CM

## 2018-11-28 LAB — D DIMER: 218 NG/ML DDU

## 2018-11-28 PROCEDURE — 86235 NUCLEAR ANTIGEN ANTIBODY: CPT

## 2018-11-28 PROCEDURE — 85378 FIBRIN DEGRADE SEMIQUANT: CPT

## 2018-11-28 PROCEDURE — 36415 COLL VENOUS BLD VENIPUNCTURE: CPT

## 2018-11-29 ENCOUNTER — TELEPHONE (OUTPATIENT)
Dept: CARDIOLOGY CLINIC | Age: 78
End: 2018-11-29

## 2018-11-29 RX ORDER — ISOSORBIDE MONONITRATE 30 MG/1
30 TABLET, EXTENDED RELEASE ORAL DAILY
Qty: 90 TABLET | Refills: 3 | Status: SHIPPED | OUTPATIENT
Start: 2018-11-29 | End: 2019-01-01 | Stop reason: SDUPTHER

## 2018-11-30 LAB
ENA TO SSA (RO) ANTIBODY: NEGATIVE
ENA TO SSB (LA) ANTIBODY: NEGATIVE

## 2018-12-03 ENCOUNTER — HOSPITAL ENCOUNTER (OUTPATIENT)
Dept: GENERAL RADIOLOGY | Age: 78
Discharge: HOME | End: 2018-12-05
Payer: MEDICARE

## 2018-12-03 ENCOUNTER — TELEPHONE (OUTPATIENT)
Dept: PULMONOLOGY | Age: 78
End: 2018-12-03

## 2018-12-03 ENCOUNTER — HOSPITAL ENCOUNTER (OUTPATIENT)
Age: 78
Discharge: HOME | End: 2018-12-05
Payer: MEDICARE

## 2018-12-03 DIAGNOSIS — R06.02 SHORTNESS OF BREATH: ICD-10-CM

## 2018-12-03 DIAGNOSIS — R06.02 SHORTNESS OF BREATH: Primary | ICD-10-CM

## 2018-12-03 PROCEDURE — 71046 X-RAY EXAM CHEST 2 VIEWS: CPT

## 2018-12-07 ENCOUNTER — OFFICE VISIT (OUTPATIENT)
Dept: CARDIOLOGY CLINIC | Age: 78
End: 2018-12-07
Payer: MEDICARE

## 2018-12-07 VITALS
HEART RATE: 44 BPM | BODY MASS INDEX: 25.72 KG/M2 | HEIGHT: 60 IN | RESPIRATION RATE: 12 BRPM | WEIGHT: 131 LBS | SYSTOLIC BLOOD PRESSURE: 100 MMHG | DIASTOLIC BLOOD PRESSURE: 80 MMHG

## 2018-12-07 DIAGNOSIS — I25.119 CORONARY ARTERY DISEASE INVOLVING NATIVE HEART WITH ANGINA PECTORIS, UNSPECIFIED VESSEL OR LESION TYPE (HCC): Primary | Chronic | ICD-10-CM

## 2018-12-07 PROCEDURE — 93000 ELECTROCARDIOGRAM COMPLETE: CPT | Performed by: INTERNAL MEDICINE

## 2018-12-07 PROCEDURE — 99214 OFFICE O/P EST MOD 30 MIN: CPT | Performed by: INTERNAL MEDICINE

## 2018-12-07 RX ORDER — NADOLOL 20 MG/1
20 TABLET ORAL DAILY
Qty: 30 TABLET | Refills: 3 | Status: SHIPPED | OUTPATIENT
Start: 2018-12-07 | End: 2019-06-07 | Stop reason: SDUPTHER

## 2018-12-07 NOTE — PROGRESS NOTES
OUTPATIENT CARDIOLOGY FOLLOW-UP    Name: Reche Hatchet    Age: 77 y.o. Primary Care Physician: Uma Gomez MD    Date of Service: 12/7/2018    Chief Complaint:   Chief Complaint   Patient presents with    1 Month Follow-Up    Shortness of Breath       Interim History:   Mrs. Gt Riddle is a 65-year-old female history of coronary artery disease, status post drug-eluting stents to LAD ×2 in 2003 and had a 2nd cath in 2005 received drug-eluting stent to LAD again. In October 2008, she had a 3rd cardiac cath which showed widely patent LAD stents. Following cath she developed a right femoral artery thrombus and underwent the right femoral artery embolectomy by Dr. Tyler Pierce. She was also diagnosed with a bronchiectasis, severe allergic rhinitis with sinusitis, hypogammaglobulinemia and the pseudomonas infection/colonization of the lungs and follows with Dr. Memo Ansari. She was seen in the office on 10/30/2018. Since her last visit, she has not had any ER visits or hospitalizations. She was noted to have low pulse ox at pulmonologist's office and pulse oxygen level drops with activity and it dropped to 88%       She had a stress test on 1/29/2018 and that was normal. She is also complaining of exertional dyspnea and gets winded if she walks from her car to the waiting room and also gets fatigued. She denies any further episodes of chest pain. Denies orthopnea, paroxysmal nocturnal dyspnea or leg edema or weight gain. She denies any palpitations, dizziness, lightheadedness, syncope or presyncope. She is compliant with her medications.       Review of Systems:   Cardiac: As per HPI  General: No fever, chills  Pulmonary: As per HPI  HEENT: No visual disturbances, difficult swallowing  GI: No nausea, vomiting  Endocrine: No thyroid disease or DM  Musculoskeletal: ROBERSON x 4, no focal motor deficits  Skin: Intact, no rashes  Neuro/Psych: No headache or seizures    Past Medical History:  Past Medical History: Other Topics Concern    Not on file     Social History Narrative    No narrative on file       Allergies:   Allergies   Allergen Reactions    Metoprolol Shortness Of Breath    Tetracyclines & Related Other (See Comments)     Large blotches and reddness and burning  Face and hands    Sulfa Antibiotics Itching       Current Medications:  Current Outpatient Prescriptions   Medication Sig Dispense Refill    isosorbide mononitrate (IMDUR) 30 MG extended release tablet Take 1 tablet by mouth daily 90 tablet 3    aspirin EC 81 MG EC tablet Take 1 tablet by mouth daily 30 tablet 3    Probiotic Product (PROBIOTIC ADVANCED PO) Take by mouth      azelastine (ASTELIN) 0.1 % nasal spray 1 spray by Nasal route 2 times daily Use in each nostril as directed 1 Bottle 3    amLODIPine (NORVASC) 5 MG tablet Take 1 tablet by mouth nightly 90 tablet 3    furosemide (LASIX) 20 MG tablet Take one tablet as needed for swelling 60 tablet 3    potassium chloride (KLOR-CON) 10 MEQ extended release tablet Take 10 mEq by mouth daily With Lasix as needed for swelling      RANEXA 500 MG extended release tablet Take 500 mg by mouth 2 times daily       nitroGLYCERIN (NITROSTAT) 0.4 MG SL tablet Place 1 tablet under the tongue every 5 minutes as needed for Chest pain (has seen PCP having usig last Nitro (10/2014)) 25 tablet 3    albuterol sulfate HFA (PROAIR HFA) 108 (90 Base) MCG/ACT inhaler Inhale 2 puffs into the lungs every 6 hours as needed for Wheezing 1 Inhaler 3    Magnesium 100 MG CAPS Take 250 mg by mouth daily       rosuvastatin (CRESTOR) 5 MG tablet Take 5 mg by mouth       Lysine 1000 MG TABS Take by mouth daily      benzonatate (TESSALON) 100 MG capsule       hydrochlorothiazide (HYDRODIURIL) 12.5 MG tablet Take 12.5 mg by mouth daily       Multiple Vitamins-Minerals (THERAPEUTIC MULTIVITAMIN-MINERALS) tablet Take 1 tablet by mouth daily      Cholecalciferol (VITAMIN D) 2000 UNITS CAPS capsule Take 10,000

## 2018-12-17 ENCOUNTER — TELEPHONE (OUTPATIENT)
Dept: CARDIOLOGY CLINIC | Age: 78
End: 2018-12-17

## 2018-12-18 ENCOUNTER — HOSPITAL ENCOUNTER (OUTPATIENT)
Dept: CT IMAGING | Age: 78
Discharge: HOME | End: 2018-12-20
Payer: MEDICARE

## 2018-12-18 DIAGNOSIS — R06.00 DYSPNEA, UNSPECIFIED TYPE: ICD-10-CM

## 2018-12-18 PROCEDURE — 71250 CT THORAX DX C-: CPT

## 2018-12-26 ASSESSMENT — ENCOUNTER SYMPTOMS
VOMITING: 0
SORE THROAT: 0
NAUSEA: 0
EYE ITCHING: 0
EYE DISCHARGE: 0
BLOOD IN STOOL: 0
CONSTIPATION: 0
ABDOMINAL DISTENTION: 0
DIARRHEA: 0
RHINORRHEA: 0
TROUBLE SWALLOWING: 0
BACK PAIN: 0
SINUS PAIN: 0
CHEST TIGHTNESS: 0
WHEEZING: 0
ABDOMINAL PAIN: 0
CHOKING: 0
SHORTNESS OF BREATH: 0

## 2019-01-01 ENCOUNTER — OFFICE VISIT (OUTPATIENT)
Dept: CARDIOLOGY CLINIC | Age: 79
End: 2019-01-01
Payer: MEDICARE

## 2019-01-01 ENCOUNTER — TELEPHONE (OUTPATIENT)
Dept: PULMONOLOGY | Age: 79
End: 2019-01-01

## 2019-01-01 ENCOUNTER — TELEPHONE (OUTPATIENT)
Dept: BREAST CENTER | Age: 79
End: 2019-01-01

## 2019-01-01 VITALS
HEART RATE: 80 BPM | SYSTOLIC BLOOD PRESSURE: 114 MMHG | HEIGHT: 60 IN | DIASTOLIC BLOOD PRESSURE: 62 MMHG | WEIGHT: 118 LBS | BODY MASS INDEX: 23.16 KG/M2 | RESPIRATION RATE: 16 BRPM

## 2019-01-01 DIAGNOSIS — I27.20 PULMONARY HYPERTENSION (HCC): ICD-10-CM

## 2019-01-01 DIAGNOSIS — I10 ESSENTIAL HYPERTENSION: ICD-10-CM

## 2019-01-01 DIAGNOSIS — Z87.898 HX OF CHEST PAIN: ICD-10-CM

## 2019-01-01 DIAGNOSIS — J47.9 IDIOPATHIC BRONCHIECTASIS (HCC): ICD-10-CM

## 2019-01-01 DIAGNOSIS — I25.10 CORONARY ARTERY DISEASE INVOLVING NATIVE CORONARY ARTERY OF NATIVE HEART WITHOUT ANGINA PECTORIS: Primary | ICD-10-CM

## 2019-01-01 DIAGNOSIS — E78.2 MIXED HYPERLIPIDEMIA: ICD-10-CM

## 2019-01-01 DIAGNOSIS — J96.21 ACUTE ON CHRONIC RESPIRATORY FAILURE WITH HYPOXIA (HCC): ICD-10-CM

## 2019-01-01 DIAGNOSIS — J47.1 BRONCHIECTASIS WITH ACUTE EXACERBATION (HCC): ICD-10-CM

## 2019-01-01 PROCEDURE — 99213 OFFICE O/P EST LOW 20 MIN: CPT | Performed by: PHYSICIAN ASSISTANT

## 2019-01-01 RX ORDER — POTASSIUM CHLORIDE 750 MG/1
TABLET, EXTENDED RELEASE ORAL
Qty: 30 TABLET | Refills: 3 | Status: SHIPPED
Start: 2019-01-01 | End: 2020-01-01 | Stop reason: ALTCHOICE

## 2019-01-01 RX ORDER — EPINEPHRINE 1 MG/ML
0.3 INJECTION, SOLUTION, CONCENTRATE INTRAVENOUS PRN
Status: CANCELLED | OUTPATIENT
Start: 2019-01-01

## 2019-01-01 RX ORDER — ACETAMINOPHEN 325 MG/1
650 TABLET ORAL ONCE
Status: CANCELLED | OUTPATIENT
Start: 2019-01-01

## 2019-01-01 RX ORDER — POTASSIUM CHLORIDE 750 MG/1
TABLET, EXTENDED RELEASE ORAL
Qty: 30 TABLET | Refills: 0 | Status: SHIPPED | OUTPATIENT
Start: 2019-01-01 | End: 2019-01-01 | Stop reason: CLARIF

## 2019-01-01 RX ORDER — DIPHENHYDRAMINE HYDROCHLORIDE 50 MG/ML
50 INJECTION INTRAMUSCULAR; INTRAVENOUS ONCE
Status: CANCELLED | OUTPATIENT
Start: 2019-01-01

## 2019-01-01 RX ORDER — LEVALBUTEROL INHALATION SOLUTION 1.25 MG/3ML
1.25 SOLUTION RESPIRATORY (INHALATION) EVERY 6 HOURS PRN
Qty: 1080 ML | Refills: 5 | Status: SHIPPED | OUTPATIENT
Start: 2019-01-01 | End: 2020-01-01 | Stop reason: SDUPTHER

## 2019-01-01 RX ORDER — SODIUM CHLORIDE FOR INHALATION 7 %
VIAL, NEBULIZER (ML) INHALATION
Refills: 3 | COMMUNITY
Start: 2019-01-01

## 2019-01-01 RX ORDER — METHYLPREDNISOLONE SODIUM SUCCINATE 125 MG/2ML
125 INJECTION, POWDER, LYOPHILIZED, FOR SOLUTION INTRAMUSCULAR; INTRAVENOUS ONCE
Status: CANCELLED | OUTPATIENT
Start: 2019-01-01

## 2019-01-01 RX ORDER — DIPHENHYDRAMINE HCL 25 MG
25 TABLET ORAL ONCE
Status: CANCELLED | OUTPATIENT
Start: 2019-01-01

## 2019-01-01 RX ORDER — SODIUM CHLORIDE 0.9 % (FLUSH) 0.9 %
10 SYRINGE (ML) INJECTION PRN
Status: CANCELLED | OUTPATIENT
Start: 2019-01-01

## 2019-01-01 RX ORDER — SODIUM CHLORIDE 9 MG/ML
INJECTION, SOLUTION INTRAVENOUS CONTINUOUS
Status: CANCELLED | OUTPATIENT
Start: 2019-01-01

## 2019-01-01 RX ORDER — ISOSORBIDE MONONITRATE 30 MG/1
30 TABLET, EXTENDED RELEASE ORAL DAILY
Qty: 90 TABLET | Refills: 3 | Status: SHIPPED | OUTPATIENT
Start: 2019-01-01

## 2019-01-01 RX ORDER — HEPARIN SODIUM (PORCINE) LOCK FLUSH IV SOLN 100 UNIT/ML 100 UNIT/ML
500 SOLUTION INTRAVENOUS PRN
Status: CANCELLED | OUTPATIENT
Start: 2019-01-01

## 2019-01-01 RX ORDER — SODIUM CHLORIDE 0.9 % (FLUSH) 0.9 %
5 SYRINGE (ML) INJECTION PRN
Status: CANCELLED | OUTPATIENT
Start: 2019-01-01

## 2019-01-02 ENCOUNTER — TELEPHONE (OUTPATIENT)
Dept: CARDIOLOGY CLINIC | Age: 79
End: 2019-01-02

## 2019-01-03 ENCOUNTER — TELEPHONE (OUTPATIENT)
Dept: PULMONOLOGY | Age: 79
End: 2019-01-03

## 2019-01-04 DIAGNOSIS — J47.1 BRONCHIECTASIS WITH ACUTE EXACERBATION (HCC): Primary | ICD-10-CM

## 2019-01-07 ENCOUNTER — OFFICE VISIT (OUTPATIENT)
Dept: BREAST CENTER | Age: 79
End: 2019-01-07
Payer: MEDICARE

## 2019-01-07 ENCOUNTER — HOSPITAL ENCOUNTER (OUTPATIENT)
Dept: GENERAL RADIOLOGY | Age: 79
Discharge: HOME | End: 2019-01-09
Payer: MEDICARE

## 2019-01-07 VITALS
DIASTOLIC BLOOD PRESSURE: 80 MMHG | SYSTOLIC BLOOD PRESSURE: 122 MMHG | BODY MASS INDEX: 25.7 KG/M2 | WEIGHT: 130.9 LBS | OXYGEN SATURATION: 92 % | HEIGHT: 60 IN | HEART RATE: 59 BPM | RESPIRATION RATE: 16 BRPM | TEMPERATURE: 97.8 F

## 2019-01-07 DIAGNOSIS — Z12.31 VISIT FOR SCREENING MAMMOGRAM: ICD-10-CM

## 2019-01-07 DIAGNOSIS — N64.4 BREAST PAIN: ICD-10-CM

## 2019-01-07 PROCEDURE — 99213 OFFICE O/P EST LOW 20 MIN: CPT | Performed by: NURSE PRACTITIONER

## 2019-01-07 PROCEDURE — 77063 BREAST TOMOSYNTHESIS BI: CPT

## 2019-01-07 PROCEDURE — 99214 OFFICE O/P EST MOD 30 MIN: CPT | Performed by: NURSE PRACTITIONER

## 2019-01-07 ASSESSMENT — ENCOUNTER SYMPTOMS
SINUS PRESSURE: 1
VOICE CHANGE: 1
COUGH: 1

## 2019-01-21 ENCOUNTER — TELEPHONE (OUTPATIENT)
Dept: CARDIOLOGY CLINIC | Age: 79
End: 2019-01-21

## 2019-01-21 RX ORDER — RANOLAZINE 500 MG/1
500 TABLET, EXTENDED RELEASE ORAL 2 TIMES DAILY
Qty: 28 TABLET | Refills: 0 | COMMUNITY
Start: 2019-01-21 | End: 2019-01-30 | Stop reason: SDUPTHER

## 2019-01-31 RX ORDER — RANOLAZINE 500 MG/1
500 TABLET, EXTENDED RELEASE ORAL 2 TIMES DAILY
Qty: 180 TABLET | Refills: 3 | Status: SHIPPED
Start: 2019-01-31 | End: 2020-01-01 | Stop reason: SDUPTHER

## 2019-02-07 ENCOUNTER — OFFICE VISIT (OUTPATIENT)
Dept: PULMONOLOGY | Age: 79
End: 2019-02-07
Payer: MEDICARE

## 2019-02-07 VITALS
TEMPERATURE: 97.8 F | DIASTOLIC BLOOD PRESSURE: 62 MMHG | WEIGHT: 128 LBS | HEIGHT: 60 IN | RESPIRATION RATE: 18 BRPM | BODY MASS INDEX: 25.13 KG/M2 | SYSTOLIC BLOOD PRESSURE: 127 MMHG | HEART RATE: 59 BPM | OXYGEN SATURATION: 86 %

## 2019-02-07 DIAGNOSIS — R06.00 DYSPNEA, UNSPECIFIED TYPE: ICD-10-CM

## 2019-02-07 DIAGNOSIS — J47.1 BRONCHIECTASIS WITH ACUTE EXACERBATION (HCC): ICD-10-CM

## 2019-02-07 PROCEDURE — 99213 OFFICE O/P EST LOW 20 MIN: CPT | Performed by: INTERNAL MEDICINE

## 2019-02-07 PROCEDURE — 99214 OFFICE O/P EST MOD 30 MIN: CPT | Performed by: INTERNAL MEDICINE

## 2019-02-07 RX ORDER — BENZONATATE 100 MG/1
100-200 CAPSULE ORAL 3 TIMES DAILY PRN
Qty: 60 CAPSULE | Refills: 1 | Status: SHIPPED | OUTPATIENT
Start: 2019-02-07 | End: 2019-02-14

## 2019-02-07 RX ORDER — FLUCONAZOLE 100 MG/1
100 TABLET ORAL DAILY
Qty: 2 TABLET | Refills: 0 | Status: SHIPPED | OUTPATIENT
Start: 2019-02-07 | End: 2019-03-04 | Stop reason: SDUPTHER

## 2019-02-08 ENCOUNTER — TELEPHONE (OUTPATIENT)
Dept: PULMONOLOGY | Age: 79
End: 2019-02-08

## 2019-02-13 ENCOUNTER — TELEPHONE (OUTPATIENT)
Dept: PULMONOLOGY | Age: 79
End: 2019-02-13

## 2019-02-28 DIAGNOSIS — Z00.00 ROUTINE CHECK-UP: Primary | ICD-10-CM

## 2019-03-01 ENCOUNTER — TELEPHONE (OUTPATIENT)
Dept: BREAST CENTER | Age: 79
End: 2019-03-01

## 2019-03-04 ENCOUNTER — TELEPHONE (OUTPATIENT)
Dept: PULMONOLOGY | Age: 79
End: 2019-03-04

## 2019-03-04 DIAGNOSIS — J47.1 BRONCHIECTASIS WITH ACUTE EXACERBATION (HCC): Primary | ICD-10-CM

## 2019-03-04 RX ORDER — PREDNISONE 10 MG/1
TABLET ORAL
Qty: 24 TABLET | Refills: 0 | Status: SHIPPED | OUTPATIENT
Start: 2019-03-04 | End: 2019-03-16

## 2019-03-04 RX ORDER — AMOXICILLIN AND CLAVULANATE POTASSIUM 875; 125 MG/1; MG/1
1 TABLET, FILM COATED ORAL 2 TIMES DAILY
Qty: 14 TABLET | Refills: 0 | Status: SHIPPED | OUTPATIENT
Start: 2019-03-04 | End: 2019-03-11

## 2019-03-04 RX ORDER — FLUCONAZOLE 100 MG/1
100 TABLET ORAL DAILY
Qty: 2 TABLET | Refills: 0 | Status: SHIPPED | OUTPATIENT
Start: 2019-03-04 | End: 2019-03-06

## 2019-03-12 ENCOUNTER — ANESTHESIA EVENT (OUTPATIENT)
Dept: ENDOSCOPY | Age: 79
End: 2019-03-12
Payer: MEDICARE

## 2019-03-13 ENCOUNTER — ANESTHESIA (OUTPATIENT)
Dept: ENDOSCOPY | Age: 79
End: 2019-03-13
Payer: MEDICARE

## 2019-03-13 ENCOUNTER — HOSPITAL ENCOUNTER (OUTPATIENT)
Age: 79
Setting detail: OUTPATIENT SURGERY
Discharge: HOME | End: 2019-03-13
Attending: INTERNAL MEDICINE | Admitting: INTERNAL MEDICINE
Payer: MEDICARE

## 2019-03-13 ENCOUNTER — APPOINTMENT (OUTPATIENT)
Dept: GENERAL RADIOLOGY | Age: 79
End: 2019-03-13
Attending: INTERNAL MEDICINE
Payer: MEDICARE

## 2019-03-13 VITALS
DIASTOLIC BLOOD PRESSURE: 52 MMHG | RESPIRATION RATE: 28 BRPM | OXYGEN SATURATION: 93 % | SYSTOLIC BLOOD PRESSURE: 76 MMHG

## 2019-03-13 VITALS
HEIGHT: 60 IN | OXYGEN SATURATION: 92 % | DIASTOLIC BLOOD PRESSURE: 54 MMHG | WEIGHT: 125 LBS | RESPIRATION RATE: 15 BRPM | HEART RATE: 56 BPM | TEMPERATURE: 97.3 F | BODY MASS INDEX: 24.54 KG/M2 | SYSTOLIC BLOOD PRESSURE: 116 MMHG

## 2019-03-13 LAB
APPEARANCE FLUID: CLEAR
APPEARANCE FLUID: NORMAL
BASO FLUID: 0 %
BASO FLUID: 0 %
CELL COUNT FLUID TYPE: NORMAL
CELL COUNT FLUID TYPE: NORMAL
COLOR FLUID: COLORLESS
COLOR FLUID: COLORLESS
EOSINOPHIL FLUID: 0 %
EOSINOPHIL FLUID: 7 %
LYMPHOCYTES, BODY FLUID: 0 %
LYMPHOCYTES, BODY FLUID: 7 %
MONOCYTE, FLUID: 10 %
MONOCYTE, FLUID: 37 %
NEUTROPHIL, FLUID: 49 %
NEUTROPHIL, FLUID: 90 %
NUCLEATED CELLS FLUID: 300 /UL
NUCLEATED CELLS FLUID: 750 /UL
RBC FLUID: 2900 /UL
RBC FLUID: <2000 /UL

## 2019-03-13 PROCEDURE — 2500000003 HC RX 250 WO HCPCS: Performed by: INTERNAL MEDICINE

## 2019-03-13 PROCEDURE — 3700000000 HC ANESTHESIA ATTENDED CARE: Performed by: INTERNAL MEDICINE

## 2019-03-13 PROCEDURE — 7100000011 HC PHASE II RECOVERY - ADDTL 15 MIN: Performed by: INTERNAL MEDICINE

## 2019-03-13 PROCEDURE — 6360000002 HC RX W HCPCS: Performed by: NURSE ANESTHETIST, CERTIFIED REGISTERED

## 2019-03-13 PROCEDURE — 87305 ASPERGILLUS AG IA: CPT

## 2019-03-13 PROCEDURE — 87206 SMEAR FLUORESCENT/ACID STAI: CPT

## 2019-03-13 PROCEDURE — 7100000010 HC PHASE II RECOVERY - FIRST 15 MIN: Performed by: INTERNAL MEDICINE

## 2019-03-13 PROCEDURE — 87116 MYCOBACTERIA CULTURE: CPT

## 2019-03-13 PROCEDURE — 89051 BODY FLUID CELL COUNT: CPT

## 2019-03-13 PROCEDURE — 2709999900 HC NON-CHARGEABLE SUPPLY: Performed by: INTERNAL MEDICINE

## 2019-03-13 PROCEDURE — 87015 SPECIMEN INFECT AGNT CONCNTJ: CPT

## 2019-03-13 PROCEDURE — 88112 CYTOPATH CELL ENHANCE TECH: CPT

## 2019-03-13 PROCEDURE — 87205 SMEAR GRAM STAIN: CPT

## 2019-03-13 PROCEDURE — 87077 CULTURE AEROBIC IDENTIFY: CPT

## 2019-03-13 PROCEDURE — 87186 SC STD MICRODIL/AGAR DIL: CPT

## 2019-03-13 PROCEDURE — 3609010800 HC BRONCHOSCOPY ALVEOLAR LAVAGE: Performed by: INTERNAL MEDICINE

## 2019-03-13 PROCEDURE — 2580000003 HC RX 258: Performed by: NURSE ANESTHETIST, CERTIFIED REGISTERED

## 2019-03-13 PROCEDURE — 71045 X-RAY EXAM CHEST 1 VIEW: CPT

## 2019-03-13 PROCEDURE — 3700000001 HC ADD 15 MINUTES (ANESTHESIA): Performed by: INTERNAL MEDICINE

## 2019-03-13 PROCEDURE — 87070 CULTURE OTHR SPECIMN AEROBIC: CPT

## 2019-03-13 PROCEDURE — 87102 FUNGUS ISOLATION CULTURE: CPT

## 2019-03-13 RX ORDER — SODIUM CHLORIDE 9 MG/ML
INJECTION, SOLUTION INTRAVENOUS CONTINUOUS PRN
Status: DISCONTINUED | OUTPATIENT
Start: 2019-03-13 | End: 2019-03-13 | Stop reason: SDUPTHER

## 2019-03-13 RX ORDER — LIDOCAINE HYDROCHLORIDE 10 MG/ML
INJECTION, SOLUTION EPIDURAL; INFILTRATION; INTRACAUDAL; PERINEURAL PRN
Status: DISCONTINUED | OUTPATIENT
Start: 2019-03-13 | End: 2019-03-13 | Stop reason: ALTCHOICE

## 2019-03-13 RX ORDER — PROPOFOL 10 MG/ML
INJECTION, EMULSION INTRAVENOUS PRN
Status: DISCONTINUED | OUTPATIENT
Start: 2019-03-13 | End: 2019-03-13 | Stop reason: SDUPTHER

## 2019-03-13 RX ADMIN — SODIUM CHLORIDE: 9 INJECTION, SOLUTION INTRAVENOUS at 09:36

## 2019-03-13 RX ADMIN — PROPOFOL 120 MG: 10 INJECTION, EMULSION INTRAVENOUS at 09:48

## 2019-03-13 ASSESSMENT — PULMONARY FUNCTION TESTS
PIF_VALUE: 0
PIF_VALUE: 1

## 2019-03-13 ASSESSMENT — PAIN SCALES - GENERAL
PAINLEVEL_OUTOF10: 0

## 2019-03-13 ASSESSMENT — ENCOUNTER SYMPTOMS: SHORTNESS OF BREATH: 1

## 2019-03-13 ASSESSMENT — PAIN - FUNCTIONAL ASSESSMENT: PAIN_FUNCTIONAL_ASSESSMENT: 0-10

## 2019-03-13 ASSESSMENT — LIFESTYLE VARIABLES: SMOKING_STATUS: 0

## 2019-03-14 LAB
GRAM STAIN ORDERABLE: NORMAL
GRAM STAIN ORDERABLE: NORMAL

## 2019-03-15 DIAGNOSIS — B95.8 STAPH INFECTION: Primary | ICD-10-CM

## 2019-03-18 LAB
CULTURE, RESPIRATORY: ABNORMAL
Lab: NORMAL
ORGANISM: ABNORMAL
REPORT: NORMAL
SMEAR, RESPIRATORY: ABNORMAL
SMEAR, RESPIRATORY: ABNORMAL
THIS TEST SENT TO: NORMAL

## 2019-03-21 ENCOUNTER — OFFICE VISIT (OUTPATIENT)
Dept: PULMONOLOGY | Age: 79
End: 2019-03-21
Payer: MEDICARE

## 2019-03-21 VITALS
HEART RATE: 61 BPM | BODY MASS INDEX: 24.94 KG/M2 | SYSTOLIC BLOOD PRESSURE: 112 MMHG | DIASTOLIC BLOOD PRESSURE: 55 MMHG | WEIGHT: 127 LBS | HEIGHT: 60 IN | TEMPERATURE: 97.5 F | RESPIRATION RATE: 16 BRPM | OXYGEN SATURATION: 88 %

## 2019-03-21 DIAGNOSIS — J47.1 BRONCHIECTASIS WITH ACUTE EXACERBATION (HCC): ICD-10-CM

## 2019-03-21 PROCEDURE — 99213 OFFICE O/P EST LOW 20 MIN: CPT | Performed by: INTERNAL MEDICINE

## 2019-03-21 PROCEDURE — 99214 OFFICE O/P EST MOD 30 MIN: CPT | Performed by: INTERNAL MEDICINE

## 2019-03-21 RX ORDER — LEVOFLOXACIN 500 MG/1
500 TABLET, FILM COATED ORAL DAILY
Qty: 14 TABLET | Refills: 0 | Status: SHIPPED | OUTPATIENT
Start: 2019-03-21 | End: 2019-03-22 | Stop reason: ALTCHOICE

## 2019-03-21 RX ORDER — GUAIFENESIN AND CODEINE PHOSPHATE 100; 10 MG/5ML; MG/5ML
7.5 SOLUTION ORAL 2 TIMES DAILY PRN
Qty: 90 ML | Refills: 0 | Status: SHIPPED | OUTPATIENT
Start: 2019-03-21 | End: 2019-03-28

## 2019-03-21 RX ORDER — FLUCONAZOLE 100 MG/1
100 TABLET ORAL DAILY
Qty: 5 TABLET | Refills: 5 | Status: SHIPPED | OUTPATIENT
Start: 2019-03-21 | End: 2019-03-22 | Stop reason: ALTCHOICE

## 2019-03-21 RX ORDER — FLUCONAZOLE 100 MG/1
100 TABLET ORAL DAILY
Qty: 15 TABLET | Refills: 0 | Status: SHIPPED | OUTPATIENT
Start: 2019-03-21 | End: 2019-03-22 | Stop reason: ALTCHOICE

## 2019-03-21 RX ORDER — LEVOFLOXACIN 500 MG/1
500 TABLET, FILM COATED ORAL DAILY
Qty: 10 TABLET | Refills: 0 | Status: SHIPPED | OUTPATIENT
Start: 2019-03-21 | End: 2019-03-21 | Stop reason: SDUPTHER

## 2019-03-21 RX ORDER — CEPHALEXIN 500 MG/1
500 CAPSULE ORAL 3 TIMES DAILY
Qty: 10 CAPSULE | Refills: 0 | Status: SHIPPED | OUTPATIENT
Start: 2019-03-21 | End: 2019-03-21 | Stop reason: SDUPTHER

## 2019-03-21 RX ORDER — CEPHALEXIN 500 MG/1
500 CAPSULE ORAL 3 TIMES DAILY
Qty: 42 CAPSULE | Refills: 0 | Status: SHIPPED | OUTPATIENT
Start: 2019-03-21 | End: 2019-03-22 | Stop reason: ALTCHOICE

## 2019-03-22 ENCOUNTER — OFFICE VISIT (OUTPATIENT)
Dept: CARDIOLOGY CLINIC | Age: 79
End: 2019-03-22
Payer: MEDICARE

## 2019-03-22 ENCOUNTER — TELEPHONE (OUTPATIENT)
Dept: PULMONOLOGY | Age: 79
End: 2019-03-22

## 2019-03-22 VITALS
DIASTOLIC BLOOD PRESSURE: 60 MMHG | SYSTOLIC BLOOD PRESSURE: 104 MMHG | HEART RATE: 61 BPM | WEIGHT: 127.5 LBS | BODY MASS INDEX: 25.03 KG/M2 | RESPIRATION RATE: 16 BRPM | HEIGHT: 60 IN

## 2019-03-22 DIAGNOSIS — I25.119 CORONARY ARTERY DISEASE WITH ANGINA PECTORIS, UNSPECIFIED VESSEL OR LESION TYPE, UNSPECIFIED WHETHER NATIVE OR TRANSPLANTED HEART (HCC): Primary | Chronic | ICD-10-CM

## 2019-03-22 PROCEDURE — 93000 ELECTROCARDIOGRAM COMPLETE: CPT | Performed by: INTERNAL MEDICINE

## 2019-03-22 PROCEDURE — 99214 OFFICE O/P EST MOD 30 MIN: CPT | Performed by: INTERNAL MEDICINE

## 2019-03-25 ENCOUNTER — TELEPHONE (OUTPATIENT)
Dept: PULMONOLOGY | Age: 79
End: 2019-03-25

## 2019-04-03 ENCOUNTER — TELEPHONE (OUTPATIENT)
Dept: PULMONOLOGY | Age: 79
End: 2019-04-03

## 2019-04-03 DIAGNOSIS — J47.1 BRONCHIECTASIS WITH ACUTE EXACERBATION (HCC): Primary | ICD-10-CM

## 2019-04-03 RX ORDER — LEVOFLOXACIN 500 MG/1
500 TABLET, FILM COATED ORAL DAILY
Qty: 5 TABLET | Refills: 0 | Status: SHIPPED | OUTPATIENT
Start: 2019-04-03 | End: 2019-04-08

## 2019-04-03 NOTE — TELEPHONE ENCOUNTER
Call returned to [facility] re: continued antibiotic before seeing infectious disease Dr. Analia Portillo. Dr. Manuelito Sotelo ordered additional 5 days of Levaquin for pt and script sent to pharmacy on file. Advised pt to get lab work ordered by Dr. Manuelito Sotelo after completion of five day antibiotic. Pt verbalized understanding.

## 2019-04-15 LAB
FUNGUS (MYCOLOGY) CULTURE: ABNORMAL
FUNGUS (MYCOLOGY) CULTURE: ABNORMAL
FUNGUS (MYCOLOGY) CULTURE: NORMAL
FUNGUS STAIN: ABNORMAL
FUNGUS STAIN: NORMAL
ORGANISM: ABNORMAL

## 2019-04-25 ENCOUNTER — TELEPHONE (OUTPATIENT)
Dept: SURGERY | Age: 79
End: 2019-04-25

## 2019-04-25 DIAGNOSIS — R19.7 DIARRHEA, UNSPECIFIED TYPE: Primary | ICD-10-CM

## 2019-04-25 NOTE — TELEPHONE ENCOUNTER
Notified pt to  order and supplies for c diff test. Pt understood instructions and will be sending her  to .

## 2019-04-25 NOTE — TELEPHONE ENCOUNTER
PT CALLED STATING THAT SHE HAS BEEN HAVING ISSUES WITH DIARRHEA FOR THE PAST FEW DAYS AND NOTHING SHE HAS TRIED IS WORKING. STATES THAT SHE HAS BRONCHIECTASIS AND HAS BEEN ANTIBIOTIC FOR THE LAST MONTH. CURRENTLY TAKING AUGMENTIN. PER DR AVILEZ PT IS TO BE CHECKED FOR C-DIFF. INFORMATION GIVEN TO CARLOS.

## 2019-04-26 ENCOUNTER — HOSPITAL ENCOUNTER (OUTPATIENT)
Age: 79
Setting detail: SPECIMEN
Discharge: HOME | End: 2019-04-26
Payer: MEDICARE

## 2019-04-26 ENCOUNTER — TELEPHONE (OUTPATIENT)
Dept: PULMONOLOGY | Age: 79
End: 2019-04-26

## 2019-04-26 PROCEDURE — 87324 CLOSTRIDIUM AG IA: CPT

## 2019-04-26 NOTE — TELEPHONE ENCOUNTER
Call from [facility] stating I am not sure what to do. \"My  thinks I need to go tot he ER. I'm just not feeling any better with these antibiotics and I am afraid to use the tobramycin nebulizer since I had so much trouble and side effects the last time\". Flor not feeling like she has a choice except to go thru the IV antibiotic treatments as discussed with Dr. Julio César Landon. Advised pt RN will update Dr. Julio César Landon of her concerns today and will follow up with her on Monday after talking with Dr. Lennox Ni.

## 2019-04-27 LAB — C DIFFICILE TOXIN, EIA: NORMAL

## 2019-04-30 LAB
AFB CULTURE (MYCOBACTERIA): NORMAL
AFB CULTURE (MYCOBACTERIA): NORMAL
AFB SMEAR: NORMAL
AFB SMEAR: NORMAL

## 2019-05-02 ENCOUNTER — TELEPHONE (OUTPATIENT)
Dept: SURGERY | Age: 79
End: 2019-05-02

## 2019-05-07 ENCOUNTER — HOSPITAL ENCOUNTER (OUTPATIENT)
Age: 79
Discharge: HOME | End: 2019-05-09
Payer: MEDICARE

## 2019-05-07 ENCOUNTER — HOSPITAL ENCOUNTER (OUTPATIENT)
Dept: INFUSION THERAPY | Age: 79
Setting detail: INFUSION SERIES
Discharge: HOME | End: 2019-05-07
Payer: MEDICARE

## 2019-05-07 VITALS
RESPIRATION RATE: 16 BRPM | OXYGEN SATURATION: 89 % | DIASTOLIC BLOOD PRESSURE: 61 MMHG | TEMPERATURE: 97.7 F | SYSTOLIC BLOOD PRESSURE: 134 MMHG | HEART RATE: 56 BPM | BODY MASS INDEX: 23.56 KG/M2 | HEIGHT: 60 IN | WEIGHT: 120 LBS

## 2019-05-07 LAB
ALBUMIN SERPL-MCNC: 4.1 G/DL (ref 3.5–5.2)
ALP BLD-CCNC: 60 U/L (ref 35–104)
ALT SERPL-CCNC: 14 U/L (ref 0–32)
ANION GAP SERPL CALCULATED.3IONS-SCNC: 13 MMOL/L (ref 7–16)
AST SERPL-CCNC: 20 U/L (ref 0–31)
BASOPHILS ABSOLUTE: 0.07 E9/L (ref 0–0.2)
BASOPHILS RELATIVE PERCENT: 0.7 % (ref 0–2)
BILIRUB SERPL-MCNC: 0.3 MG/DL (ref 0–1.2)
BUN BLDV-MCNC: 26 MG/DL (ref 8–23)
C-REACTIVE PROTEIN: 0.5 MG/DL (ref 0–0.4)
CALCIUM SERPL-MCNC: 9.5 MG/DL (ref 8.6–10.2)
CHLORIDE BLD-SCNC: 100 MMOL/L (ref 98–107)
CO2: 25 MMOL/L (ref 22–29)
CREAT SERPL-MCNC: 0.9 MG/DL (ref 0.5–1)
EOSINOPHILS ABSOLUTE: 0.3 E9/L (ref 0.05–0.5)
EOSINOPHILS RELATIVE PERCENT: 2.9 % (ref 0–6)
GFR AFRICAN AMERICAN: >60
GFR NON-AFRICAN AMERICAN: >60 ML/MIN/1.73
GLUCOSE BLD-MCNC: 97 MG/DL (ref 74–99)
HCT VFR BLD CALC: 35.5 % (ref 34–48)
HEMOGLOBIN: 11.2 G/DL (ref 11.5–15.5)
IMMATURE GRANULOCYTES #: 0.05 E9/L
IMMATURE GRANULOCYTES %: 0.5 % (ref 0–5)
LYMPHOCYTES ABSOLUTE: 2.27 E9/L (ref 1.5–4)
LYMPHOCYTES RELATIVE PERCENT: 22.1 % (ref 20–42)
MCH RBC QN AUTO: 26.1 PG (ref 26–35)
MCHC RBC AUTO-ENTMCNC: 31.5 % (ref 32–34.5)
MCV RBC AUTO: 82.8 FL (ref 80–99.9)
MONOCYTES ABSOLUTE: 0.72 E9/L (ref 0.1–0.95)
MONOCYTES RELATIVE PERCENT: 7 % (ref 2–12)
NEUTROPHILS ABSOLUTE: 6.84 E9/L (ref 1.8–7.3)
NEUTROPHILS RELATIVE PERCENT: 66.8 % (ref 43–80)
PDW BLD-RTO: 14.3 FL (ref 11.5–15)
PLATELET # BLD: 239 E9/L (ref 130–450)
PMV BLD AUTO: 11.6 FL (ref 7–12)
POTASSIUM SERPL-SCNC: 4.3 MMOL/L (ref 3.5–5)
RBC # BLD: 4.29 E12/L (ref 3.5–5.5)
SEDIMENTATION RATE, ERYTHROCYTE: 0 MM/HR (ref 0–20)
SODIUM BLD-SCNC: 138 MMOL/L (ref 132–146)
TOTAL PROTEIN: 6.9 G/DL (ref 6.4–8.3)
WBC # BLD: 10.3 E9/L (ref 4.5–11.5)

## 2019-05-07 PROCEDURE — 86140 C-REACTIVE PROTEIN: CPT

## 2019-05-07 PROCEDURE — 6360000002 HC RX W HCPCS: Performed by: INTERNAL MEDICINE

## 2019-05-07 PROCEDURE — 76937 US GUIDE VASCULAR ACCESS: CPT

## 2019-05-07 PROCEDURE — 2500000003 HC RX 250 WO HCPCS: Performed by: INTERNAL MEDICINE

## 2019-05-07 PROCEDURE — 85651 RBC SED RATE NONAUTOMATED: CPT

## 2019-05-07 PROCEDURE — 80053 COMPREHEN METABOLIC PANEL: CPT

## 2019-05-07 PROCEDURE — 36569 INSJ PICC 5 YR+ W/O IMAGING: CPT

## 2019-05-07 PROCEDURE — C1751 CATH, INF, PER/CENT/MIDLINE: HCPCS

## 2019-05-07 PROCEDURE — 2580000003 HC RX 258: Performed by: INTERNAL MEDICINE

## 2019-05-07 PROCEDURE — 85025 COMPLETE CBC W/AUTO DIFF WBC: CPT

## 2019-05-07 RX ORDER — SODIUM CHLORIDE 0.9 % (FLUSH) 0.9 %
10 SYRINGE (ML) INJECTION PRN
Status: DISCONTINUED | OUTPATIENT
Start: 2019-05-07 | End: 2019-05-08 | Stop reason: HOSPADM

## 2019-05-07 RX ORDER — HEPARIN SODIUM (PORCINE) LOCK FLUSH IV SOLN 100 UNIT/ML 100 UNIT/ML
SOLUTION INTRAVENOUS
Status: DISCONTINUED
Start: 2019-05-07 | End: 2019-05-07 | Stop reason: HOSPADM

## 2019-05-07 RX ORDER — SODIUM CHLORIDE 0.9 % (FLUSH) 0.9 %
SYRINGE (ML) INJECTION
Status: DISCONTINUED
Start: 2019-05-07 | End: 2019-05-07 | Stop reason: HOSPADM

## 2019-05-07 RX ORDER — HEPARIN SODIUM (PORCINE) LOCK FLUSH IV SOLN 100 UNIT/ML 100 UNIT/ML
3 SOLUTION INTRAVENOUS EVERY 12 HOURS SCHEDULED
Status: DISCONTINUED | OUTPATIENT
Start: 2019-05-07 | End: 2019-05-08 | Stop reason: HOSPADM

## 2019-05-07 RX ORDER — HEPARIN SODIUM (PORCINE) LOCK FLUSH IV SOLN 100 UNIT/ML 100 UNIT/ML
3 SOLUTION INTRAVENOUS PRN
Status: DISCONTINUED | OUTPATIENT
Start: 2019-05-07 | End: 2019-05-08 | Stop reason: HOSPADM

## 2019-05-07 RX ORDER — LIDOCAINE HYDROCHLORIDE 10 MG/ML
5 INJECTION, SOLUTION EPIDURAL; INFILTRATION; INTRACAUDAL; PERINEURAL ONCE
Status: COMPLETED | OUTPATIENT
Start: 2019-05-07 | End: 2019-05-07

## 2019-05-07 RX ADMIN — Medication 10 ML: at 11:08

## 2019-05-07 RX ADMIN — LIDOCAINE HYDROCHLORIDE 1 ML: 10 INJECTION, SOLUTION EPIDURAL; INFILTRATION; INTRACAUDAL; PERINEURAL at 10:59

## 2019-05-07 RX ADMIN — Medication 20 ML: at 11:00

## 2019-05-07 RX ADMIN — Medication 300 UNITS: at 11:09

## 2019-05-07 NOTE — PROGRESS NOTES
PATIENT INSTRUCTED TO KEEP DRESSING CLEAN AND DRY; TO AVOID STRENUOUS ACTIVITY AND LIMIT LIFTING  WITH ARM OF PICC TO TEN POUNDS OR LESS; INSTRUCTED TO CALL THE DOCTOR FOR ANY REDNESS, TENDERNESS OR  DRAINAGE AT INSERTION SITE AND/OR LEAKAGE OR  CHANGE IN  EXTERNAL CATHETER PATIENT VERBALIZES UNDERSTANDING.

## 2019-05-07 NOTE — PROCEDURES
PICC   Catheter insertion date 5/7/2019     Product Number:  [id number]   Lot No: [id number]   Gauge: 4FR   Lumen: SINGLE   L Basilic    Vein Diameter : 0.38CM   Upper arm circumference (10CM ABOVE AC): 25CM   Catheter Length : 36CM   Internal Length: 35CM   Exposed Catheter Length: 1CM   Ultrasound Used:YES  VPS [facility] [place] confirms PICC tip is placed in the lower 1/3 of the SVC or at the Cavoatrial junction. Floor nurse notified PICC is okay to use.    : MAGI Pool

## 2019-05-09 ENCOUNTER — HOSPITAL ENCOUNTER (OUTPATIENT)
Age: 79
Discharge: HOME | DRG: 191 | End: 2019-05-11
Payer: MEDICARE

## 2019-05-09 PROCEDURE — 80053 COMPREHEN METABOLIC PANEL: CPT

## 2019-05-09 PROCEDURE — 85025 COMPLETE CBC W/AUTO DIFF WBC: CPT

## 2019-05-10 LAB
ALBUMIN SERPL-MCNC: 3.9 G/DL (ref 3.5–5.2)
ALP BLD-CCNC: 54 U/L (ref 35–104)
ALT SERPL-CCNC: 14 U/L (ref 0–32)
ANION GAP SERPL CALCULATED.3IONS-SCNC: 14 MMOL/L (ref 7–16)
AST SERPL-CCNC: 19 U/L (ref 0–31)
BASOPHILS ABSOLUTE: 0.08 E9/L (ref 0–0.2)
BASOPHILS RELATIVE PERCENT: 0.8 % (ref 0–2)
BILIRUB SERPL-MCNC: 0.4 MG/DL (ref 0–1.2)
BUN BLDV-MCNC: 23 MG/DL (ref 8–23)
CALCIUM SERPL-MCNC: 9.2 MG/DL (ref 8.6–10.2)
CHLORIDE BLD-SCNC: 101 MMOL/L (ref 98–107)
CO2: 23 MMOL/L (ref 22–29)
CREAT SERPL-MCNC: 0.7 MG/DL (ref 0.5–1)
EOSINOPHILS ABSOLUTE: 0.64 E9/L (ref 0.05–0.5)
EOSINOPHILS RELATIVE PERCENT: 6.7 % (ref 0–6)
GFR AFRICAN AMERICAN: >60
GFR NON-AFRICAN AMERICAN: >60 ML/MIN/1.73
GLUCOSE BLD-MCNC: 103 MG/DL (ref 74–99)
HCT VFR BLD CALC: 36.2 % (ref 34–48)
HEMOGLOBIN: 11 G/DL (ref 11.5–15.5)
IMMATURE GRANULOCYTES %: 0.6 % (ref 0–5)
LYMPHOCYTES ABSOLUTE: 1.97 E9/L (ref 1.5–4)
LYMPHOCYTES RELATIVE PERCENT: 20.6 % (ref 20–42)
MCH RBC QN AUTO: 25.8 PG (ref 26–35)
MCHC RBC AUTO-ENTMCNC: 30.4 % (ref 32–34.5)
MCV RBC AUTO: 84.8 FL (ref 80–99.9)
MONOCYTES ABSOLUTE: 0.63 E9/L (ref 0.1–0.95)
MONOCYTES RELATIVE PERCENT: 6.6 % (ref 2–12)
NEUTROPHILS ABSOLUTE: 6.19 E9/L (ref 1.8–7.3)
NEUTROPHILS RELATIVE PERCENT: 64.7 % (ref 43–80)
PDW BLD-RTO: 14.5 FL (ref 11.5–15)
PLATELET # BLD: 196 E9/L (ref 130–450)
PMV BLD AUTO: 11.9 FL (ref 7–12)
POTASSIUM SERPL-SCNC: 4.6 MMOL/L (ref 3.5–5)
RBC # BLD: 4.27 E12/L (ref 3.5–5.5)
SODIUM BLD-SCNC: 138 MMOL/L (ref 132–146)
TOTAL PROTEIN: 6.7 G/DL (ref 6.4–8.3)
WBC # BLD: 9.5 E9/L (ref 4.5–11.5)

## 2019-05-13 ENCOUNTER — HOSPITAL ENCOUNTER (OUTPATIENT)
Age: 79
Discharge: HOME | DRG: 191 | End: 2019-05-15
Payer: MEDICARE

## 2019-05-13 LAB
ALBUMIN SERPL-MCNC: 3.9 G/DL (ref 3.5–5.2)
ALP BLD-CCNC: 60 U/L (ref 35–104)
ALT SERPL-CCNC: 13 U/L (ref 0–32)
ANION GAP SERPL CALCULATED.3IONS-SCNC: 12 MMOL/L (ref 7–16)
AST SERPL-CCNC: 18 U/L (ref 0–31)
BASOPHILS ABSOLUTE: 0.1 E9/L (ref 0–0.2)
BASOPHILS RELATIVE PERCENT: 1 % (ref 0–2)
BILIRUB SERPL-MCNC: 0.3 MG/DL (ref 0–1.2)
BUN BLDV-MCNC: 27 MG/DL (ref 8–23)
C-REACTIVE PROTEIN: 2.3 MG/DL (ref 0–0.4)
CALCIUM SERPL-MCNC: 9.3 MG/DL (ref 8.6–10.2)
CHLORIDE BLD-SCNC: 102 MMOL/L (ref 98–107)
CO2: 23 MMOL/L (ref 22–29)
CREAT SERPL-MCNC: 0.8 MG/DL (ref 0.5–1)
EOSINOPHILS ABSOLUTE: 1.02 E9/L (ref 0.05–0.5)
EOSINOPHILS RELATIVE PERCENT: 10.4 % (ref 0–6)
GFR AFRICAN AMERICAN: >60
GFR NON-AFRICAN AMERICAN: >60 ML/MIN/1.73
GLUCOSE BLD-MCNC: 127 MG/DL (ref 74–99)
HCT VFR BLD CALC: 32.7 % (ref 34–48)
HEMOGLOBIN: 10.3 G/DL (ref 11.5–15.5)
IMMATURE GRANULOCYTES #: 0.05 E9/L
IMMATURE GRANULOCYTES %: 0.5 % (ref 0–5)
LYMPHOCYTES ABSOLUTE: 2 E9/L (ref 1.5–4)
LYMPHOCYTES RELATIVE PERCENT: 20.4 % (ref 20–42)
MCH RBC QN AUTO: 25.9 PG (ref 26–35)
MCHC RBC AUTO-ENTMCNC: 31.5 % (ref 32–34.5)
MCV RBC AUTO: 82.4 FL (ref 80–99.9)
MONOCYTES ABSOLUTE: 0.81 E9/L (ref 0.1–0.95)
MONOCYTES RELATIVE PERCENT: 8.2 % (ref 2–12)
NEUTROPHILS ABSOLUTE: 5.84 E9/L (ref 1.8–7.3)
NEUTROPHILS RELATIVE PERCENT: 59.5 % (ref 43–80)
PDW BLD-RTO: 14.4 FL (ref 11.5–15)
PLATELET # BLD: 158 E9/L (ref 130–450)
PMV BLD AUTO: 12.6 FL (ref 7–12)
POTASSIUM SERPL-SCNC: 3.8 MMOL/L (ref 3.5–5)
RBC # BLD: 3.97 E12/L (ref 3.5–5.5)
SODIUM BLD-SCNC: 137 MMOL/L (ref 132–146)
TOTAL PROTEIN: 6.5 G/DL (ref 6.4–8.3)
WBC # BLD: 9.8 E9/L (ref 4.5–11.5)

## 2019-05-13 PROCEDURE — 85025 COMPLETE CBC W/AUTO DIFF WBC: CPT

## 2019-05-13 PROCEDURE — 86140 C-REACTIVE PROTEIN: CPT

## 2019-05-13 PROCEDURE — 80053 COMPREHEN METABOLIC PANEL: CPT

## 2019-05-13 PROCEDURE — 85651 RBC SED RATE NONAUTOMATED: CPT

## 2019-05-14 ENCOUNTER — TELEPHONE (OUTPATIENT)
Dept: PULMONOLOGY | Age: 79
End: 2019-05-14

## 2019-05-14 ENCOUNTER — APPOINTMENT (OUTPATIENT)
Dept: GENERAL RADIOLOGY | Age: 79
DRG: 191 | End: 2019-05-14
Attending: INTERNAL MEDICINE
Payer: MEDICARE

## 2019-05-14 ENCOUNTER — HOSPITAL ENCOUNTER (INPATIENT)
Age: 79
LOS: 5 days | Discharge: HOME | DRG: 191 | End: 2019-05-19
Attending: INTERNAL MEDICINE | Admitting: INTERNAL MEDICINE
Payer: MEDICARE

## 2019-05-14 PROBLEM — J47.9 BRONCHIECTASIS (HCC): Status: ACTIVE | Noted: 2019-05-14

## 2019-05-14 LAB — SEDIMENTATION RATE, ERYTHROCYTE: 40 MM/HR (ref 0–20)

## 2019-05-14 PROCEDURE — 6370000000 HC RX 637 (ALT 250 FOR IP): Performed by: INTERNAL MEDICINE

## 2019-05-14 PROCEDURE — 2700000000 HC OXYGEN THERAPY PER DAY

## 2019-05-14 PROCEDURE — 6360000002 HC RX W HCPCS: Performed by: INTERNAL MEDICINE

## 2019-05-14 PROCEDURE — 2580000003 HC RX 258: Performed by: INTERNAL MEDICINE

## 2019-05-14 PROCEDURE — 94640 AIRWAY INHALATION TREATMENT: CPT

## 2019-05-14 PROCEDURE — 71045 X-RAY EXAM CHEST 1 VIEW: CPT

## 2019-05-14 PROCEDURE — 2140000000 HC CCU INTERMEDIATE R&B

## 2019-05-14 PROCEDURE — 99222 1ST HOSP IP/OBS MODERATE 55: CPT | Performed by: INTERNAL MEDICINE

## 2019-05-14 RX ORDER — SODIUM CHLORIDE 0.9 % (FLUSH) 0.9 %
10 SYRINGE (ML) INJECTION EVERY 12 HOURS SCHEDULED
Status: DISCONTINUED | OUTPATIENT
Start: 2019-05-14 | End: 2019-05-19 | Stop reason: HOSPADM

## 2019-05-14 RX ORDER — CHOLECALCIFEROL (VITAMIN D3) 50 MCG
2000 TABLET ORAL DAILY
Status: DISCONTINUED | OUTPATIENT
Start: 2019-05-14 | End: 2019-05-19 | Stop reason: HOSPADM

## 2019-05-14 RX ORDER — ACETYLCYSTEINE 200 MG/ML
600 SOLUTION ORAL; RESPIRATORY (INHALATION) 2 TIMES DAILY
Status: DISCONTINUED | OUTPATIENT
Start: 2019-05-14 | End: 2019-05-14 | Stop reason: CLARIF

## 2019-05-14 RX ORDER — IPRATROPIUM BROMIDE AND ALBUTEROL SULFATE 2.5; .5 MG/3ML; MG/3ML
1 SOLUTION RESPIRATORY (INHALATION)
Status: DISCONTINUED | OUTPATIENT
Start: 2019-05-14 | End: 2019-05-19 | Stop reason: HOSPADM

## 2019-05-14 RX ORDER — SODIUM CHLORIDE 0.9 % (FLUSH) 0.9 %
10 SYRINGE (ML) INJECTION PRN
Status: DISCONTINUED | OUTPATIENT
Start: 2019-05-14 | End: 2019-05-19 | Stop reason: HOSPADM

## 2019-05-14 RX ORDER — RANOLAZINE 500 MG/1
500 TABLET, EXTENDED RELEASE ORAL 2 TIMES DAILY
Status: DISCONTINUED | OUTPATIENT
Start: 2019-05-14 | End: 2019-05-19 | Stop reason: HOSPADM

## 2019-05-14 RX ORDER — ONDANSETRON 2 MG/ML
4 INJECTION INTRAMUSCULAR; INTRAVENOUS EVERY 6 HOURS PRN
Status: DISCONTINUED | OUTPATIENT
Start: 2019-05-14 | End: 2019-05-19 | Stop reason: HOSPADM

## 2019-05-14 RX ORDER — SODIUM CHLORIDE FOR INHALATION 3 %
4 VIAL, NEBULIZER (ML) INHALATION 2 TIMES DAILY
Status: DISCONTINUED | OUTPATIENT
Start: 2019-05-14 | End: 2019-05-19 | Stop reason: HOSPADM

## 2019-05-14 RX ORDER — NADOLOL 20 MG/1
20 TABLET ORAL NIGHTLY
Status: DISCONTINUED | OUTPATIENT
Start: 2019-05-14 | End: 2019-05-19 | Stop reason: HOSPADM

## 2019-05-14 RX ORDER — AMLODIPINE BESYLATE 5 MG/1
5 TABLET ORAL NIGHTLY
Status: DISCONTINUED | OUTPATIENT
Start: 2019-05-14 | End: 2019-05-19 | Stop reason: HOSPADM

## 2019-05-14 RX ORDER — ROSUVASTATIN CALCIUM 5 MG/1
5 TABLET, COATED ORAL
Status: DISCONTINUED | OUTPATIENT
Start: 2019-05-15 | End: 2019-05-19 | Stop reason: HOSPADM

## 2019-05-14 RX ORDER — LEVOTHYROXINE AND LIOTHYRONINE 19; 4.5 UG/1; UG/1
60 TABLET ORAL DAILY
Status: DISCONTINUED | OUTPATIENT
Start: 2019-05-14 | End: 2019-05-19 | Stop reason: HOSPADM

## 2019-05-14 RX ORDER — POTASSIUM CHLORIDE 750 MG/1
10 TABLET, EXTENDED RELEASE ORAL DAILY
Status: DISCONTINUED | OUTPATIENT
Start: 2019-05-14 | End: 2019-05-19 | Stop reason: HOSPADM

## 2019-05-14 RX ORDER — CEFEPIME HYDROCHLORIDE 1 G/1
2 INJECTION, POWDER, FOR SOLUTION INTRAMUSCULAR; INTRAVENOUS EVERY 12 HOURS
Status: DISCONTINUED | OUTPATIENT
Start: 2019-05-14 | End: 2019-05-14

## 2019-05-14 RX ORDER — BUDESONIDE 0.25 MG/2ML
0.25 INHALANT ORAL 2 TIMES DAILY
Status: DISCONTINUED | OUTPATIENT
Start: 2019-05-14 | End: 2019-05-19 | Stop reason: HOSPADM

## 2019-05-14 RX ORDER — MAGNESIUM GLUCONATE 27 MG(500)
250 TABLET ORAL DAILY
Status: DISCONTINUED | OUTPATIENT
Start: 2019-05-14 | End: 2019-05-19 | Stop reason: HOSPADM

## 2019-05-14 RX ORDER — ISOSORBIDE MONONITRATE 30 MG/1
30 TABLET, EXTENDED RELEASE ORAL DAILY
Status: DISCONTINUED | OUTPATIENT
Start: 2019-05-14 | End: 2019-05-19 | Stop reason: HOSPADM

## 2019-05-14 RX ORDER — HYDROCHLOROTHIAZIDE 12.5 MG/1
12.5 TABLET ORAL DAILY
Status: DISCONTINUED | OUTPATIENT
Start: 2019-05-14 | End: 2019-05-19 | Stop reason: HOSPADM

## 2019-05-14 RX ORDER — ASPIRIN 81 MG/1
81 TABLET ORAL DAILY
Status: DISCONTINUED | OUTPATIENT
Start: 2019-05-14 | End: 2019-05-19 | Stop reason: HOSPADM

## 2019-05-14 RX ADMIN — IPRATROPIUM BROMIDE AND ALBUTEROL SULFATE 1 AMPULE: .5; 3 SOLUTION RESPIRATORY (INHALATION) at 16:29

## 2019-05-14 RX ADMIN — Medication 2000 UNITS: at 14:25

## 2019-05-14 RX ADMIN — SODIUM CHLORIDE SOLN NEBU 3% 4 ML: 3 NEBU SOLN at 20:02

## 2019-05-14 RX ADMIN — NADOLOL 20 MG: 20 TABLET ORAL at 21:09

## 2019-05-14 RX ADMIN — AMLODIPINE BESYLATE 5 MG: 5 TABLET ORAL at 21:10

## 2019-05-14 RX ADMIN — PIPERACILLIN AND TAZOBACTAM 3.38 G: 3; .375 INJECTION, POWDER, FOR SOLUTION INTRAVENOUS at 17:58

## 2019-05-14 RX ADMIN — Medication 10 ML: at 21:11

## 2019-05-14 RX ADMIN — RANOLAZINE 500 MG: 500 TABLET, FILM COATED, EXTENDED RELEASE ORAL at 21:10

## 2019-05-14 RX ADMIN — ENOXAPARIN SODIUM 40 MG: 40 INJECTION SUBCUTANEOUS at 14:26

## 2019-05-14 RX ADMIN — IPRATROPIUM BROMIDE AND ALBUTEROL SULFATE 1 AMPULE: .5; 3 SOLUTION RESPIRATORY (INHALATION) at 20:02

## 2019-05-14 RX ADMIN — LEVOTHYROXINE, LIOTHYRONINE 60 MG: 19; 4.5 TABLET ORAL at 21:09

## 2019-05-14 RX ADMIN — Medication 250 MG: at 21:09

## 2019-05-14 NOTE — PLAN OF CARE
Problem: Safety:  Goal: Free from accidental physical injury  Description  Free from accidental physical injury  Outcome: Met This Shift  Goal: Free from intentional harm  Description  Free from intentional harm  Outcome: Met This Shift     Problem: Daily Care:  Goal: Daily care needs are met  Description  Daily care needs are met  Outcome: Met This Shift     Problem: Pain:  Goal: Patient's pain/discomfort is manageable  Description  Patient's pain/discomfort is manageable  Outcome: Met This Shift     Problem: Skin Integrity:  Goal: Skin integrity will stabilize  Description  Skin integrity will stabilize  Outcome: Met This Shift

## 2019-05-14 NOTE — TELEPHONE ENCOUNTER
Dr. Romina Ellis called and spoke with pt about her response/reaction to recent IV Antibiotic treatment and symptoms she is feeling with new treatment. Dr. Danny Arango advised pt he will speak to Dr. Stefany Perry and coordinate with her new orders for a different antibiotic. Pt is in agreement with this plan. Advised pt office will follow up after Dr. Romina Ellis and Dr. Patti Wetzel have discussed.

## 2019-05-15 PROBLEM — E44.0 MODERATE PROTEIN-CALORIE MALNUTRITION (HCC): Status: ACTIVE | Noted: 2019-05-15

## 2019-05-15 LAB
BASOPHILS ABSOLUTE: 0.09 E9/L (ref 0–0.2)
BASOPHILS RELATIVE PERCENT: 1.1 % (ref 0–2)
EOSINOPHILS ABSOLUTE: 0.78 E9/L (ref 0.05–0.5)
EOSINOPHILS RELATIVE PERCENT: 9.3 % (ref 0–6)
HCT VFR BLD CALC: 29.1 % (ref 34–48)
HEMOGLOBIN: 9.4 G/DL (ref 11.5–15.5)
IMMATURE GRANULOCYTES #: 0.08 E9/L
IMMATURE GRANULOCYTES %: 1 % (ref 0–5)
LYMPHOCYTES ABSOLUTE: 2.2 E9/L (ref 1.5–4)
LYMPHOCYTES RELATIVE PERCENT: 26.3 % (ref 20–42)
MCH RBC QN AUTO: 26.2 PG (ref 26–35)
MCHC RBC AUTO-ENTMCNC: 32.3 % (ref 32–34.5)
MCV RBC AUTO: 81.1 FL (ref 80–99.9)
METER GLUCOSE: 99 MG/DL (ref 74–99)
MONOCYTES ABSOLUTE: 0.77 E9/L (ref 0.1–0.95)
MONOCYTES RELATIVE PERCENT: 9.2 % (ref 2–12)
NEUTROPHILS ABSOLUTE: 4.43 E9/L (ref 1.8–7.3)
NEUTROPHILS RELATIVE PERCENT: 53.1 % (ref 43–80)
PDW BLD-RTO: 14.2 FL (ref 11.5–15)
PLATELET # BLD: 124 E9/L (ref 130–450)
PMV BLD AUTO: 11.3 FL (ref 7–12)
RBC # BLD: 3.59 E12/L (ref 3.5–5.5)
WBC # BLD: 8.4 E9/L (ref 4.5–11.5)

## 2019-05-15 PROCEDURE — 94640 AIRWAY INHALATION TREATMENT: CPT

## 2019-05-15 PROCEDURE — 94669 MECHANICAL CHEST WALL OSCILL: CPT

## 2019-05-15 PROCEDURE — 99233 SBSQ HOSP IP/OBS HIGH 50: CPT | Performed by: INTERNAL MEDICINE

## 2019-05-15 PROCEDURE — 6360000002 HC RX W HCPCS: Performed by: INTERNAL MEDICINE

## 2019-05-15 PROCEDURE — 6370000000 HC RX 637 (ALT 250 FOR IP): Performed by: INTERNAL MEDICINE

## 2019-05-15 PROCEDURE — 36415 COLL VENOUS BLD VENIPUNCTURE: CPT

## 2019-05-15 PROCEDURE — 82962 GLUCOSE BLOOD TEST: CPT

## 2019-05-15 PROCEDURE — 2140000000 HC CCU INTERMEDIATE R&B

## 2019-05-15 PROCEDURE — 85025 COMPLETE CBC W/AUTO DIFF WBC: CPT

## 2019-05-15 PROCEDURE — 2580000003 HC RX 258: Performed by: INTERNAL MEDICINE

## 2019-05-15 PROCEDURE — 94667 MNPJ CHEST WALL 1ST: CPT

## 2019-05-15 PROCEDURE — 2700000000 HC OXYGEN THERAPY PER DAY

## 2019-05-15 RX ORDER — ACETAMINOPHEN 325 MG/1
650 TABLET ORAL EVERY 4 HOURS PRN
Status: DISCONTINUED | OUTPATIENT
Start: 2019-05-15 | End: 2019-05-19 | Stop reason: HOSPADM

## 2019-05-15 RX ADMIN — ISOSORBIDE MONONITRATE 30 MG: 30 TABLET ORAL at 08:44

## 2019-05-15 RX ADMIN — BUDESONIDE 250 MCG: 0.25 SUSPENSION RESPIRATORY (INHALATION) at 07:59

## 2019-05-15 RX ADMIN — Medication 250 MG: at 08:45

## 2019-05-15 RX ADMIN — ROSUVASTATIN CALCIUM 5 MG: 5 TABLET, FILM COATED ORAL at 08:44

## 2019-05-15 RX ADMIN — ASPIRIN 81 MG: 81 TABLET ORAL at 08:44

## 2019-05-15 RX ADMIN — RANOLAZINE 500 MG: 500 TABLET, FILM COATED, EXTENDED RELEASE ORAL at 08:45

## 2019-05-15 RX ADMIN — Medication 10 ML: at 21:26

## 2019-05-15 RX ADMIN — IPRATROPIUM BROMIDE AND ALBUTEROL SULFATE 1 AMPULE: .5; 3 SOLUTION RESPIRATORY (INHALATION) at 07:58

## 2019-05-15 RX ADMIN — SODIUM CHLORIDE SOLN NEBU 3% 4 ML: 3 NEBU SOLN at 20:02

## 2019-05-15 RX ADMIN — ACETAMINOPHEN 650 MG: 325 TABLET, FILM COATED ORAL at 22:58

## 2019-05-15 RX ADMIN — BUDESONIDE 250 MCG: 0.25 SUSPENSION RESPIRATORY (INHALATION) at 20:04

## 2019-05-15 RX ADMIN — PIPERACILLIN AND TAZOBACTAM 3.38 G: 3; .375 INJECTION, POWDER, FOR SOLUTION INTRAVENOUS at 17:42

## 2019-05-15 RX ADMIN — AMLODIPINE BESYLATE 5 MG: 5 TABLET ORAL at 21:26

## 2019-05-15 RX ADMIN — IPRATROPIUM BROMIDE AND ALBUTEROL SULFATE 1 AMPULE: .5; 3 SOLUTION RESPIRATORY (INHALATION) at 11:47

## 2019-05-15 RX ADMIN — RANOLAZINE 500 MG: 500 TABLET, FILM COATED, EXTENDED RELEASE ORAL at 21:26

## 2019-05-15 RX ADMIN — Medication 10 ML: at 08:43

## 2019-05-15 RX ADMIN — SODIUM CHLORIDE SOLN NEBU 3% 4 ML: 3 NEBU SOLN at 07:59

## 2019-05-15 RX ADMIN — ENOXAPARIN SODIUM 40 MG: 40 INJECTION SUBCUTANEOUS at 08:43

## 2019-05-15 RX ADMIN — IPRATROPIUM BROMIDE AND ALBUTEROL SULFATE 1 AMPULE: .5; 3 SOLUTION RESPIRATORY (INHALATION) at 20:00

## 2019-05-15 RX ADMIN — LEVOTHYROXINE, LIOTHYRONINE 60 MG: 19; 4.5 TABLET ORAL at 06:04

## 2019-05-15 RX ADMIN — PIPERACILLIN AND TAZOBACTAM 3.38 G: 3; .375 INJECTION, POWDER, FOR SOLUTION INTRAVENOUS at 08:45

## 2019-05-15 RX ADMIN — PIPERACILLIN AND TAZOBACTAM 3.38 G: 3; .375 INJECTION, POWDER, FOR SOLUTION INTRAVENOUS at 04:06

## 2019-05-15 RX ADMIN — NADOLOL 20 MG: 20 TABLET ORAL at 21:26

## 2019-05-15 RX ADMIN — Medication 2000 UNITS: at 08:43

## 2019-05-15 RX ADMIN — HYDROCHLOROTHIAZIDE 12.5 MG: 12.5 TABLET ORAL at 08:44

## 2019-05-15 RX ADMIN — POTASSIUM CHLORIDE 10 MEQ: 10 TABLET, EXTENDED RELEASE ORAL at 08:46

## 2019-05-15 ASSESSMENT — PAIN DESCRIPTION - PAIN TYPE: TYPE: ACUTE PAIN

## 2019-05-15 ASSESSMENT — PAIN SCALES - GENERAL
PAINLEVEL_OUTOF10: 0
PAINLEVEL_OUTOF10: 3
PAINLEVEL_OUTOF10: 0

## 2019-05-15 ASSESSMENT — PAIN DESCRIPTION - PROGRESSION: CLINICAL_PROGRESSION: GRADUALLY IMPROVING

## 2019-05-15 ASSESSMENT — PAIN DESCRIPTION - DESCRIPTORS: DESCRIPTORS: ACHING

## 2019-05-15 ASSESSMENT — PAIN - FUNCTIONAL ASSESSMENT: PAIN_FUNCTIONAL_ASSESSMENT: PREVENTS OR INTERFERES SOME ACTIVE ACTIVITIES AND ADLS

## 2019-05-15 ASSESSMENT — PAIN DESCRIPTION - LOCATION: LOCATION: GENERALIZED

## 2019-05-15 ASSESSMENT — PAIN DESCRIPTION - ORIENTATION: ORIENTATION: LEFT;RIGHT

## 2019-05-15 ASSESSMENT — PAIN DESCRIPTION - FREQUENCY: FREQUENCY: INTERMITTENT

## 2019-05-15 ASSESSMENT — PAIN DESCRIPTION - ONSET: ONSET: GRADUAL

## 2019-05-15 NOTE — PROGRESS NOTES
5/14, UTO updated wt d/t pt position)  · Usual Body Wt: 133 lb (60.3 kg)(actual per EMR 10/2018)  · % Weight Change:   11.2% wt loss x7 months  · Ideal Body Wt: 100 lb (45.4 kg), % Ideal Body 118%  · BMI Classification: BMI 18.5 - 24.9 Normal Weight    Nutrition Interventions:   Continued Inpatient Monitoring, Education Initiated, Coordination of Care(Reviewed ONS options/preferences, eating patterns)    Nutrition Evaluation:   · Evaluation: Goals set   · Goals: Consume >50% meals/ONS    · Monitoring: Meal Intake, Supplement Intake, Diet Tolerance, I&O, Weight, Pertinent Labs, Monitor Bowel Function      Electronically signed by Diana Arcos MS, RD, LD on 5/15/19 at 2:59 PM    Contact Number: [phone]

## 2019-05-15 NOTE — PROGRESS NOTES
Pulmonary [facility]                             Pulmonary Consult/Progress Note :          Patient: Veena Robertson  MRN: [id number]  : 1940      Date of Admission: .2019 11:54 AM    Consulting Physician:Dr Lora         Reason for Consultation:Pneumonia /severe bronchectasis   CC : cough ,SOB   HPI:   SOB and cough has  improved a lot since he been on Zosyn and chest  Vest   Doing much better   Less cough   tolerated Zosyn very well         PHYSICAL EXAMINATION:     VITAL SIGNS:  BP (!) 116/56   Pulse 60   Temp 98 °F (36.7 °C) (Temporal)   Resp 16   Ht 5' (1.524 m)   Wt 118 lb 9.6 oz (53.8 kg)   SpO2 93%   BMI 23.16 kg/m²   Wt Readings from Last 3 Encounters:   19 118 lb 9.6 oz (53.8 kg)   19 120 lb (54.4 kg)   19 125 lb (56.7 kg)     Temp Readings from Last 3 Encounters:   05/15/19 98 °F (36.7 °C) (Temporal)   19 97.7 °F (36.5 °C) (Oral)   19 97.4 °F (36.3 °C)     TMAX:  BP Readings from Last 3 Encounters:   05/15/19 (!) 116/56   19 134/61   19 104/60     Pulse Readings from Last 3 Encounters:   05/15/19 60   19 56   19 80           INTAKE/OUTPUTS:  I/O last 3 completed shifts:   In: 0   Out: 450 [Urine:450]    Intake/Output Summary (Last 24 hours) at 5/15/2019 1202  Last data filed at 5/15/2019 0948  Gross per 24 hour   Intake 240 ml   Output 450 ml   Net -210 ml       General Appearance: alert and oriented to person, place and time, well-developed and   well-nourished, in no acute distress   Eyes: pupils equal, round, and reactive to light, extraocular eye movements intact, conjunctivae normal and sclera anicteric   Neck: neck supple and non tender without mass, no thyromegaly, no thyroid nodules and no cervical adenopathy   Pulmonary/Chest:rhonchi    Cardiovascular: normal rate, regular rhythm, normal S1 and S2, no murmurs, rubs, clicks or gallops, distal pulses intact, no carotid bruits, no murmurs, no gallops, no carotid bruits and no JVD   Abdomen: obese, soft, non-tender, non-distended, normal bowel sounds, no masses or organomegaly   Extremities:No edema or cyanosis   Musculoskeletal: normal range of motion, no joint swelling, deformity or tenderness   Neurologic: reflexes normal and symmetric, no cranial nerve deficit noted    LABS/IMAGING:    CBC:  Lab Results   Component Value Date    WBC 8.4 05/15/2019    HGB 9.4 (L) 05/15/2019    HCT 29.1 (L) 05/15/2019    MCV 81.1 05/15/2019     (L) 05/15/2019    LYMPHOPCT 26.3 05/15/2019    RBC 3.59 05/15/2019    MCH 26.2 05/15/2019    MCHC 32.3 05/15/2019    RDW 14.2 05/15/2019    NEUTOPHILPCT 53.1 05/15/2019    MONOPCT 9.2 05/15/2019    BASOPCT 1.1 05/15/2019    NEUTROABS 4.43 05/15/2019    LYMPHSABS 2.20 05/15/2019    MONOSABS 0.77 05/15/2019    EOSABS 0.78 (H) 05/15/2019    BASOSABS 0.09 05/15/2019       Recent Labs     05/15/19  0416 05/13/19  1350 05/09/19  1200   WBC 8.4 9.8 9.5   HGB 9.4* 10.3* 11.0*   HCT 29.1* 32.7* 36.2   MCV 81.1 82.4 84.8   * 158 196       BMP:   Recent Labs     05/13/19  1350      K 3.8      CO2 23   BUN 27*   CREATININE 0.8       MG:   Lab Results   Component Value Date    MG 1.9 03/28/2011     Ca/Phos:   Lab Results   Component Value Date    CALCIUM 9.3 05/13/2019     Amylase:   Lab Results   Component Value Date    AMYLASE 56 03/28/2011     Lipase:   Lab Results   Component Value Date    LIPASE 24 03/28/2011     LIVER PROFILE:   Recent Labs     05/13/19  1350   AST 18   ALT 13   BILITOT 0.3   ALKPHOS 60       PT/INR: No results for input(s): PROTIME, INR in the last 72 hours. APTT: No results for input(s): APTT in the last 72 hours.     Cardiac Enzymes:  Lab Results   Component Value Date    CKTOTAL 32 (L) 03/28/2011    CKMB <0.2 03/28/2011    TROPONINI <0.01 03/28/2011                   PROBLEM LIST:  Patient Active Problem List   Diagnosis    GERD (gastroesophageal reflux disease)    CAD (coronary artery disease)    HTN (hypertension)    DJD (degenerative joint disease) of knee    Allergy to sulfa drugs    Allergy to tetracycline    Presence of drug coated stent in LAD coronary artery    Dyspnea    Other diseases of lung, not elsewhere classified    Bronchiectasis with acute exacerbation (HCC)    Bronchiectasis (HCC)               ASSESSMENT:  1-Bronchiectasis with staph and pseudomonas and candida ,caindida could be contamination  2-Severe allergic rhinitis with sinusitis  3- Hypogammaglobulinemia  4- Pseudomonas infection/colonization  5-hoarseness unknown etiology, was attributed to tobramycin inhaler use          6- ILD ? etiology         PLAN:  *-continue  zosyn ,has PICC line ,dov need to increase do to every  6 h,defer to ID   *-Chest vest  *-Mucomyst nebulizer  *incentive  of spirometry  *_Flutter valve  *-ICS   *- BD  Weight loss work up as per primary     Will see how she is doing over the next a few days and may get evaluation also for her low immunoglobulin         Luisa Moreno  Pulmonary/Critical care [address]     NOTE: This report was transcribed using voice recognition software. Every effort was made to ensure accuracy; however, inadvertent computerized transcription errors may be present.

## 2019-05-15 NOTE — H&P
Deborah Camargo is a 77 y.o. female    HPI  Pt presents due to several months of sob. She states she has been sob for some time and all of her examines have so far been negative. She did have a bronch recently. She is also c/o weight loss and no appetite. She denies any cp, n/v/d/c, fever/chills or sweats. No current facility-administered medications on file prior to encounter. Current Outpatient Medications on File Prior to Encounter   Medication Sig Dispense Refill    rosuvastatin (CRESTOR) 5 MG tablet Take 1 tablet by mouth daily Indications: 3 times a week 90 tablet 1    hydrochlorothiazide (HYDRODIURIL) 12.5 MG tablet Take 1 tablet by mouth daily 90 tablet 1    thyroid (ARMOUR THYROID) 60 MG tablet Take 1 tablet by mouth daily 90 tablet 1    ranolazine (RANEXA) 500 MG extended release tablet Take 1 tablet by mouth 2 times daily 180 tablet 3    nadolol (CORGARD) 20 MG tablet Take 1 tablet by mouth daily (Patient taking differently: Take 20 mg by mouth nightly ) 30 tablet 3    isosorbide mononitrate (IMDUR) 30 MG extended release tablet Take 1 tablet by mouth daily 90 tablet 3    aspirin EC 81 MG EC tablet Take 1 tablet by mouth daily 30 tablet 3    Probiotic Product (PROBIOTIC ADVANCED PO) Take by mouth      azelastine (ASTELIN) 0.1 % nasal spray 1 spray by Nasal route 2 times daily Use in each nostril as directed 1 Bottle 3    amLODIPine (NORVASC) 5 MG tablet Take 1 tablet by mouth nightly 90 tablet 3    nitroGLYCERIN (NITROSTAT) 0.4 MG SL tablet Place 1 tablet under the tongue every 5 minutes as needed for Chest pain (has seen PCP having halie last Nitro (10/2014)) 25 tablet 3    Magnesium 100 MG CAPS Take 250 mg by mouth daily       Multiple Vitamins-Minerals (THERAPEUTIC MULTIVITAMIN-MINERALS) tablet Take 1 tablet by mouth daily      Cholecalciferol (VITAMIN D) 2000 UNITS CAPS capsule Take 10,000 capsules by mouth daily       Biotin 5000 MCG CAPS Take  by mouth daily.         Coenzyme Q10 (CO Q 10 PO) Take 200 mg by mouth daily.  OMEGA 3 1000 MG CAPS Take  by mouth daily.         potassium chloride (KLOR-CON) 10 MEQ extended release tablet Take 10 mEq by mouth daily With Lasix as needed for swelling      albuterol sulfate HFA (PROAIR HFA) 108 (90 Base) MCG/ACT inhaler Inhale 2 puffs into the lungs every 6 hours as needed for Wheezing 1 Inhaler 3     Past Medical History:   Diagnosis Date    Arthritis     Bronchiectasis ([address]     with SOB    Bronchiectasis ([address]     CAD (coronary artery disease)     (14)- saw Nely Sweeney for yearly visit 3/2014    Cataract     bilaterally    Gastritis     GERD (gastroesophageal reflux disease)     Heart block     2 heart stents inserted    Hyperlipidemia     denies     Prolonged emergence from general anesthesia     Thyroid disease     hypo    Vitamin D deficiency      Social History     Socioeconomic History    Marital status:      Spouse name: Not on file    Number of children: Not on file    Years of education: Not on file    Highest education level: Not on file   Occupational History    Not on file   Social Needs    Financial resource strain: Not on file    Food insecurity:     Worry: Not on file     Inability: Not on file    Transportation needs:     Medical: Not on file     Non-medical: Not on file   Tobacco Use    Smoking status: Former Smoker     Packs/day: 1.00     Years: 25.00     Pack years: 25.00     Last attempt to quit: 10/19/1984     Years since quittin.5    Smokeless tobacco: Never Used   Substance and Sexual Activity    Alcohol use: No     Alcohol/week: 0.0 oz    Drug use: No    Sexual activity: Not Currently   Lifestyle    Physical activity:     Days per week: Not on file     Minutes per session: Not on file    Stress: Not on file   Relationships    Social connections:     Talks on phone: Not on file     Gets together: Not on file     Attends Spiritism service: Not on file Active member of club or organization: Not on file     Attends meetings of clubs or organizations: Not on file     Relationship status: Not on file    Intimate partner violence:     Fear of current or ex partner: Not on file     Emotionally abused: Not on file     Physically abused: Not on file     Forced sexual activity: Not on file   Other Topics Concern    Not on file   Social History Narrative    Not on file     Past Surgical History:   Procedure Laterality Date    BRONCHOSCOPY N/A 11/18/14    BRONCHOSCOPY BAL & BRUSHING    BRONCHOSCOPY N/A 3/13/2019    BRONCHOSCOPY ALVEOLAR LAVAGE performed by Irma Redding MD at [address] [place] [address]  2008    CATARACT REMOVAL Bilateral 2006    COLONOSCOPY  2006    neg    COLONOSCOPY  10/23/2015    CORONARY ANGIOPLASTY WITH STENT PLACEMENT  2004    2 stents    ENDOSCOPY, COLON, DIAGNOSTIC      ENDOSCOPY, COLON, DIAGNOSTIC  10/23/2015    HAND TENDON SURGERY  2003    left thumb    JOINT REPLACEMENT Right 2012    knee    KNEE ARTHROSCOPY  2005    right    KNEE SURGERY  5/14/2012    right knee replacement    NOSE SURGERY  2014    sinus    OTHER SURGICAL HISTORY Left     thumb arthritis surgery    PICC LINE INSERTION NURSE  5/7/2019         UPPER GASTROINTESTINAL ENDOSCOPY  2011    neg         ROS  Patient positive for  SOB. Weight loss  Patient denies any fever, chills, night sweats,   Denies headache, visual changes,   Denies dysphagia, odynophagia dysphonia,   Denies  cough, sputum production,   Denies chest pain, pressure, orthopnea, palpitations,   Denies abd pain, N/V/D/C/melena, hematochezia,   Denies urinary frequency, urgency, dysuria, hematuria,   Denies any acute muscle aches, paresthesias, weakness, seizure, syncopal episodes, Denies depression, anxiety.   OBJECTIVE  Vitals:    05/14/19 2315   BP: 128/82   Pulse: 61   Resp: 16   Temp: 98 °F (36.7 °C)   SpO2: 93%     Gen: AO3, NAD  Head: AT/NC, PERRL, EOMIx2, no icterus, conjunctival injection  Neck: No JVD, carotid bruits, LAD, thyroid non-palpable  Heart: RRR with no murmurs, rubs, gallops  Lungs: Rhonchi through all fields   Abd: soft, NT, ND, BS+, no G/R, no HSM  Ext: No C/C/E, pulses intact distally B/L  Neuro: CN 2-12 grossly intact with no focal deficits    ASSESSMENT  Patient Active Problem List   Diagnosis    GERD (gastroesophageal reflux disease)    CAD (coronary artery disease)    HTN (hypertension)    DJD (degenerative joint disease) of knee    Allergy to sulfa drugs    Allergy to tetracycline    Presence of drug coated stent in LAD coronary artery    Dyspnea    Other diseases of lung, not elsewhere classified    Bronchiectasis with acute exacerbation (HCC)    Bronchiectasis (HCC)     Allergic rhinitis  PLAN  Started on abx, check immune functions

## 2019-05-15 NOTE — CONSULTS
Pulmonary [address] [facility]                             Pulmonary Consult/Progress Note :          Patient: Manny Reese  MRN: [id number]  : 1940      Date of Admission: .2019 11:54 AM    Consulting Physician:Dr Lora         Reason for Consultation:Pneumonia /severe bronchectasis   CC : cough ,SOB   HPI:   Manny Reese is a 77y.o. year old female extremely pleasant who started smoking at the age of 15 one pack daily for 30 year and then she quit. She had significant sinus problem for which she has been following follow-up last years and she had sinus surgery without significant improvement in her symptoms. 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 She was treated with IVIG by Dr. Zaida Perales and the last level still low also she had cultures of her sputum showing Pseudomonas and also she states she received IV abx  treatment for that. Her sinus surgery did not show any granuloma.     She had also workup for autoimmune disease where she had been a negative rheumatoid factor negative with slight, Hypogammaglobulinemia, IgE level was normal and sed rate was 25 with CRP 5.5 . Patient was tried in tobramycin inhaler because she developed hoarseness after  the use.   Seen by ENT without any significant issue in the vocal cord  She had repeat Bronch  shows MSSA and pseudomonas and she was started on Cefepime ,but she did not feel with it and she has been feeling not well with cough so she is direct admitted         PAST MEDICAL HISTORY:   Past Medical History:   Diagnosis Date    Arthritis     Bronchiectasis ([address]     with SOB    Bronchiectasis ([address]     CAD (coronary artery disease)     (14)- saw Ricarda Deshpande for yearly visit 3/2014    Cataract     bilaterally    Gastritis     GERD (gastroesophageal reflux disease)     Heart block 2004    2 heart stents inserted    Hyperlipidemia     denies     Prolonged emergence from general anesthesia     Thyroid disease     hypo    Vitamin D deficiency        PAST SURGICAL HISTORY:   Past Surgical History:   Procedure Laterality Date    BRONCHOSCOPY N/A 11/18/14    BRONCHOSCOPY BAL & BRUSHING    BRONCHOSCOPY N/A 3/13/2019    BRONCHOSCOPY ALVEOLAR LAVAGE performed by Teresa Ojeda MD at [facility]  2008    CATARACT REMOVAL Bilateral 2006    COLONOSCOPY  2006    neg    COLONOSCOPY  10/23/2015    CORONARY ANGIOPLASTY WITH STENT PLACEMENT  2004    2 stents    ENDOSCOPY, COLON, DIAGNOSTIC      ENDOSCOPY, COLON, DIAGNOSTIC  10/23/2015    HAND TENDON SURGERY  2003    left thumb    JOINT REPLACEMENT Right 2012    knee    KNEE ARTHROSCOPY  2005    right    KNEE SURGERY  5/14/2012    right knee replacement    NOSE SURGERY  2014    sinus    OTHER SURGICAL HISTORY Left     thumb arthritis surgery    PICC LINE INSERTION NURSE  5/7/2019         UPPER GASTROINTESTINAL ENDOSCOPY  2011    neg       FAMILY HISTORY:   Family History   Problem Relation Age of Onset    Cancer Paternal Grandfather         stomach    Diabetes Maternal Grandmother     Other Father         no clinical problems dies at age 80 years   [facility] Stroke Mother     Osteoporosis Mother     Alzheimer's Disease Brother         in nursing home       SOCIAL HISTORY:   Social History     Socioeconomic History    Marital status:      Spouse name: Not on file    Number of children: Not on file    Years of education: Not on file    Highest education level: Not on file   Occupational History    Not on file   Social Needs    Financial resource strain: Not on file    Food insecurity:     Worry: Not on file     Inability: Not on file    Transportation needs:     Medical: Not on file Non-medical: Not on file   Tobacco Use    Smoking status: Former Smoker     Packs/day: 1.00     Years: 25.00     Pack years: 25.00     Last attempt to quit: 10/19/1984     Years since quittin.5    Smokeless tobacco: Never Used   Substance and Sexual Activity    Alcohol use: No     Alcohol/week: 0.0 oz    Drug use: No    Sexual activity: Not Currently   Lifestyle    Physical activity:     Days per week: Not on file     Minutes per session: Not on file    Stress: Not on file   Relationships    Social connections:     Talks on phone: Not on file     Gets together: Not on file     Attends Sabianism service: Not on file     Active member of club or organization: Not on file     Attends meetings of clubs or organizations: Not on file     Relationship status: Not on file    Intimate partner violence:     Fear of current or ex partner: Not on file     Emotionally abused: Not on file     Physically abused: Not on file     Forced sexual activity: Not on file   Other Topics Concern    Not on file   Social History Narrative    Not on file     Social History     Tobacco Use   Smoking Status Former Smoker    Packs/day: 1.00    Years: 25.00    Pack years: 25.00    Last attempt to quit: 10/19/1984    Years since quittin.5   Smokeless Tobacco Never Used     Social History     Substance and Sexual Activity   Alcohol Use No    Alcohol/week: 0.0 oz     Social History     Substance and Sexual Activity   Drug Use No       OCCUPATIONAL HISTORY:            HOME MEDICATIONS:  Prior to Admission medications    Medication Sig Start Date End Date Taking?  Authorizing Provider   rosuvastatin (CRESTOR) 5 MG tablet Take 1 tablet by mouth daily Indications: 3 times a week 19  Yes Radha Harris MD   hydrochlorothiazide (HYDRODIURIL) 12.5 MG tablet Take 1 tablet by mouth daily 19  Yes Radha Harris MD   thyroid (ARMOUR THYROID) 60 MG tablet Take 1 tablet by mouth daily 19  Yes Abner MD Geno   ranolazine (RANEXA) 500 MG extended release tablet Take 1 tablet by mouth 2 times daily 1/31/19  Yes Yanet Gonzalez MD   nadolol (CORGARD) 20 MG tablet Take 1 tablet by mouth daily  Patient taking differently: Take 20 mg by mouth nightly  12/7/18  Yes Yanet Gonzalez MD   isosorbide mononitrate (IMDUR) 30 MG extended release tablet Take 1 tablet by mouth daily 11/29/18  Yes Yanet Gonzalez MD   aspirin EC 81 MG EC tablet Take 1 tablet by mouth daily 10/30/18  Yes Yanet Gonzalez MD   Probiotic Product (PROBIOTIC ADVANCED PO) Take by mouth   Yes Historical Provider, MD   azelastine (ASTELIN) 0.1 % nasal spray 1 spray by Nasal route 2 times daily Use in each nostril as directed 10/9/18  Yes Heather Vaughan DO   amLODIPine (NORVASC) 5 MG tablet Take 1 tablet by mouth nightly 7/3/18  Yes Yanet Gonzalez MD   nitroGLYCERIN (NITROSTAT) 0.4 MG SL tablet Place 1 tablet under the tongue every 5 minutes as needed for Chest pain (has seen PCP having usig last Nitro (10/2014)) 1/17/18  Yes Yanet Gonzalez MD   Magnesium 100 MG CAPS Take 250 mg by mouth daily    Yes Historical Provider, MD   Multiple Vitamins-Minerals (THERAPEUTIC MULTIVITAMIN-MINERALS) tablet Take 1 tablet by mouth daily   Yes Historical Provider, MD   Cholecalciferol (VITAMIN D) 2000 UNITS CAPS capsule Take 10,000 capsules by mouth daily    Yes Historical Provider, MD   Biotin 5000 MCG CAPS Take  by mouth daily. Yes Historical Provider, MD   Coenzyme Q10 (CO Q 10 PO) Take 200 mg by mouth daily. Yes Historical Provider, MD   OMEGA 3 1000 MG CAPS Take  by mouth daily.      Yes Historical Provider, MD   potassium chloride (KLOR-CON) 10 MEQ extended release tablet Take 10 mEq by mouth daily With Lasix as needed for swelling    Historical Provider, MD   albuterol sulfate HFA (PROAIR HFA) 108 (90 Base) MCG/ACT inhaler Inhale 2 puffs into the lungs every 6 hours as needed for Wheezing 8/28/17   Rafa Thurston MD       CURRENT ROS:   No CP,No Palpitation   Gastrointestinal ROS:   No Gi bleed,no nausea or vomiting      - Musculoskeletal ROS:      - no joint swelling ,no joint pain   Neurological ROS:     -no weakness or numbness    Dermatological ROS:   No skin rash ,no urticaria     PHYSICAL EXAMINATION:     VITAL SIGNS:  /86   Pulse 59   Temp 98.2 °F (36.8 °C) (Temporal)   Resp 17   Ht 5' (1.524 m)   Wt 118 lb 9.6 oz (53.8 kg)   SpO2 93%   BMI 23.16 kg/m²   Wt Readings from Last 3 Encounters:   05/14/19 118 lb 9.6 oz (53.8 kg)   05/07/19 120 lb (54.4 kg)   04/24/19 125 lb (56.7 kg)     Temp Readings from Last 3 Encounters:   05/14/19 98.2 °F (36.8 °C) (Temporal)   05/07/19 97.7 °F (36.5 °C) (Oral)   05/01/19 97.4 °F (36.3 °C)     TMAX:  BP Readings from Last 3 Encounters:   05/14/19 131/86   05/07/19 134/61   05/01/19 104/60     Pulse Readings from Last 3 Encounters:   05/14/19 59   05/07/19 56   05/01/19 80           INTAKE/OUTPUTS:  No intake/output data recorded.     Intake/Output Summary (Last 24 hours) at 5/14/2019 2256  Last data filed at 5/14/2019 2235  Gross per 24 hour   Intake 0 ml   Output 150 ml   Net -150 ml       General Appearance: alert and oriented to person, place and time, well-developed and   well-nourished, in no acute distress   Eyes: pupils equal, round, and reactive to light, extraocular eye movements intact, conjunctivae normal and sclera anicteric   Neck: neck supple and non tender without mass, no thyromegaly, no thyroid nodules and no cervical adenopathy   Pulmonary/Chest:rhonchi    Cardiovascular: normal rate, regular rhythm, normal S1 and S2, no murmurs, rubs, clicks or gallops, distal pulses intact, no carotid bruits, no murmurs, no gallops, no carotid bruits and no JVD   Abdomen: obese, soft, non-tender, non-distended, normal bowel sounds, no masses or organomegaly   Extremities:No edema or cyanosis   Musculoskeletal: normal range of motion, no joint swelling, deformity or tenderness  Bronchiectasis with acute exacerbation (HCC)    Bronchiectasis (HCC)               ASSESSMENT:  1-Bronchiectasis with staph and pseudomonas and candida ,caindida could be contamination  2-Severe allergic rhinitis with sinusitis  3- Hypogammaglobulinemia  4- Pseudomonas infection/colonization  5-hoarseness unknown etiology, was attributed to tobramycin inhaler use          6- ILD ? etiology         PLAN:  *-Discuss the case with Dr. Jose Armando Choi  and we will start the patient on zosyn   *-Chest vest  *-Mucomyst nebulizer  *incentive  of spirometry  *_Flutter valve  ICS   BD    Will see how she is doing over the next a few days and may get evaluation also for her low immunoglobulin    Thank you very much for allowing me to participate in the care of this pleasant patient , should you have any questions ,please do not hesitate to contact me         [address] [facility]     NOTE: This report was transcribed using voice recognition software. Every effort was made to ensure accuracy; however, inadvertent computerized transcription errors may be present.

## 2019-05-15 NOTE — CARE COORDINATION
Transition of care: Met with patient and pt's , Jhonatan Nathan, in room. Pt live with her  in a 1 story condo. Independent with ADLs and drives. DME- cane and crutches which pt does not use. Oxygen at 2l/nc cont and a nebulizer from [facility]. Pt also has pulse oximetry. Pt is currently active with [facility]. I called and spoke with Maria E Tan ph#[phone] at [address] [place] [address]. Pt comes in weekly to have her picc line dressing done and for lab draws. She said pt does her own home antibiotic infusion. Pt is getting IV push Cefepime 2 gm q 8 hrs from May 7th to May 21st per Maria E Tan. She said they just need notified when pt is discharge and if there is a change in her antibiotics.  PCP is Dr. Marco Gutierrez and pharmacy is [facility] mail order and [facility] on [address] will follow

## 2019-05-15 NOTE — CONSULTS
[address] [place] [address] [facility]    Consultation Note     Admit Date: 5/14/2019 11:54 AM    Reason for Consult:   pneumonia    Attending Physician:  Lori Gutierrez MD     cc- weakness      History Obtained From:  For a detailed history please see previous and current available medical records. HISTORY OF PRESENT ILLNESS:             The patient is a 77 y.o. female known to the Infectious Diseases service. I had seen her recently in my office and started her on iv cefepime 2 g q8 for MSSA and pseudomonas bronchiectasis. She had bronchoscopy in March 2019 which showed purulent secretions and concern for infection. She had intolerance to many antibiotics in the past.  She started cefepime on may 1 2019. For the last 2 weeks she had been feeling very weak and had no energy. She called her pulmonologist's office and ID was consulted.     Past Medical History:        Diagnosis Date    Arthritis     Bronchiectasis ([address]     with SOB    Bronchiectasis ([address]     CAD (coronary artery disease)     (11/13/14)- saw Kamla Galloway for yearly visit 3/2014    Cataract     bilaterally    Gastritis     GERD (gastroesophageal reflux disease)     Heart block 2004    2 heart stents inserted    Hyperlipidemia     denies     Prolonged emergence from general anesthesia     Thyroid disease     hypo    Vitamin D deficiency      Past Surgical History:        Procedure Laterality Date    BRONCHOSCOPY N/A 11/18/14    BRONCHOSCOPY BAL & BRUSHING    BRONCHOSCOPY N/A 3/13/2019    BRONCHOSCOPY ALVEOLAR LAVAGE performed by Mae Perry MD at [address] [place] [address]  2008    CATARACT REMOVAL Bilateral 2006    COLONOSCOPY  2006    neg    COLONOSCOPY  10/23/2015    CORONARY ANGIOPLASTY WITH STENT PLACEMENT  2004    2 stents    ENDOSCOPY, COLON, DIAGNOSTIC      ENDOSCOPY, COLON, DIAGNOSTIC  10/23/2015    HAND TENDON SURGERY  2003    left thumb    JOINT REPLACEMENT Right 2012 knee    KNEE ARTHROSCOPY  2005    right    KNEE SURGERY  2012    right knee replacement    NOSE SURGERY  2014    sinus    OTHER SURGICAL HISTORY Left     thumb arthritis surgery    PICC LINE INSERTION NURSE  2019         UPPER GASTROINTESTINAL ENDOSCOPY  2011    neg     Current Medications:   Scheduled Meds:   amLODIPine  5 mg Oral Nightly    aspirin EC  81 mg Oral Daily    vitamin D  2,000 Units Oral Daily    hydrochlorothiazide  12.5 mg Oral Daily    isosorbide mononitrate  30 mg Oral Daily    magnesium gluconate  250 mg Oral Daily    nadolol  20 mg Oral Nightly    potassium chloride  10 mEq Oral Daily    ranolazine  500 mg Oral BID    rosuvastatin  5 mg Oral Once per day on     thyroid  60 mg Oral Daily    sodium chloride flush  10 mL Intravenous 2 times per day    enoxaparin  40 mg Subcutaneous Daily    ipratropium-albuterol  1 ampule Inhalation Q4H WA    piperacillin-tazobactam  3.375 g Intravenous Q8H    sodium chloride (Inhalant)  4 mL Nebulization BID    budesonide  0.25 mg Nebulization BID     Continuous Infusions:  PRN Meds:sodium chloride flush, magnesium hydroxide, ondansetron    Allergies:  Metoprolol;  Tetracyclines & related; and Sulfa antibiotics    Social History:   Social History     Socioeconomic History    Marital status:      Spouse name: Not on file    Number of children: Not on file    Years of education: Not on file    Highest education level: Not on file   Occupational History    Not on file   Social Needs    Financial resource strain: Not on file    Food insecurity:     Worry: Not on file     Inability: Not on file    Transportation needs:     Medical: Not on file     Non-medical: Not on file   Tobacco Use    Smoking status: Former Smoker     Packs/day: 1.00     Years: 25.00     Pack years: 25.00     Last attempt to quit: 10/19/1984     Years since quittin.5    Smokeless tobacco: Never Used   Substance and Sexual Activity    Alcohol use: No     Alcohol/week: 0.0 oz    Drug use: No    Sexual activity: Not Currently   Lifestyle    Physical activity:     Days per week: Not on file     Minutes per session: Not on file    Stress: Not on file   Relationships    Social connections:     Talks on phone: Not on file     Gets together: Not on file     Attends [employer] service: Not on file     Active member of club or organization: Not on file     Attends meetings of clubs or organizations: Not on file     Relationship status: Not on file    Intimate partner violence:     Fear of current or ex partner: Not on file     Emotionally abused: Not on file     Physically abused: Not on file     Forced sexual activity: Not on file   Other Topics Concern    Not on file   Social History Narrative    Not on file       Family History:       Problem Relation Age of Onset    Cancer Paternal Grandfather         stomach    Diabetes Maternal Grandmother     Other Father         no clinical problems dies at age 80 years   [facility] Stroke Mother     Osteoporosis Mother     Alzheimer's Disease Brother         in nursing home   . Otherwise non-pertinent to the chief complaint.     REVIEW OF SYSTEMS:    14 ROS negative unless otherwise specified in the HPI    PHYSICAL EXAM:    Vitals:    BP (!) 106/59   Pulse 68   Temp 99 °F (37.2 °C) (Temporal)   Resp 16   Ht 5' (1.524 m)   Wt 118 lb 9.6 oz (53.8 kg)   SpO2 93%   BMI 23.16 kg/m²     General Appearance:    Alert, cooperative, no distress, appears stated age   Head:    Normocephalic, without obvious abnormality, atraumatic   Eyes:    PERRL, conjunctiva/corneas clear      Ears:    Normal and external ear canals, both ears   Nose:   Nares normal, septum midline, mucosa normal, no drainage    or sinus tenderness   Throat:   Lips, mucosa, and tongue normal; teeth and gums normal   Neck:   Supple, trachea midline, no adenopathy; no JVD   Lungs:     Clear to auscultation bilaterally, respirations unlabored   Chest

## 2019-05-15 NOTE — PLAN OF CARE
Problem: Safety:  Goal: Free from accidental physical injury  Description  Free from accidental physical injury  Outcome: Met This Shift     Problem: Daily Care:  Goal: Daily care needs are met  Description  Daily care needs are met  Outcome: Met This Shift     Problem: Pain:  Goal: Patient's pain/discomfort is manageable  Description  Patient's pain/discomfort is manageable  Outcome: Met This Shift     Problem: Malnutrition  (NI-5.2)  Goal: Food and/or Nutrient Delivery  Description  Individualized approach for food/nutrient provision.   5/15/2019 1459 by Shahnaz Pillai MS, RD, LD  Outcome: Met This Shift     Problem: Breathing Pattern - Ineffective:  Goal: Ability to achieve and maintain a regular respiratory rate will improve  Description  Ability to achieve and maintain a regular respiratory rate will improve  Outcome: Met This Shift

## 2019-05-16 LAB
D DIMER: 282 NG/ML DDU
PRO-BNP: 1189 PG/ML (ref 0–450)
PRO-BNP: 1224 PG/ML (ref 0–450)
SEDIMENTATION RATE, ERYTHROCYTE: 44 MM/HR (ref 0–20)

## 2019-05-16 PROCEDURE — 6370000000 HC RX 637 (ALT 250 FOR IP): Performed by: INTERNAL MEDICINE

## 2019-05-16 PROCEDURE — 2700000000 HC OXYGEN THERAPY PER DAY

## 2019-05-16 PROCEDURE — 94640 AIRWAY INHALATION TREATMENT: CPT

## 2019-05-16 PROCEDURE — 2580000003 HC RX 258: Performed by: INTERNAL MEDICINE

## 2019-05-16 PROCEDURE — 2140000000 HC CCU INTERMEDIATE R&B

## 2019-05-16 PROCEDURE — 6360000002 HC RX W HCPCS: Performed by: INTERNAL MEDICINE

## 2019-05-16 PROCEDURE — 85651 RBC SED RATE NONAUTOMATED: CPT

## 2019-05-16 PROCEDURE — 83880 ASSAY OF NATRIURETIC PEPTIDE: CPT

## 2019-05-16 PROCEDURE — 94669 MECHANICAL CHEST WALL OSCILL: CPT

## 2019-05-16 PROCEDURE — 36415 COLL VENOUS BLD VENIPUNCTURE: CPT

## 2019-05-16 PROCEDURE — 99233 SBSQ HOSP IP/OBS HIGH 50: CPT | Performed by: INTERNAL MEDICINE

## 2019-05-16 PROCEDURE — 85378 FIBRIN DEGRADE SEMIQUANT: CPT

## 2019-05-16 PROCEDURE — 94668 MNPJ CHEST WALL SBSQ: CPT

## 2019-05-16 RX ORDER — PIPERACILLIN SODIUM, TAZOBACTAM SODIUM 4; .5 G/20ML; G/20ML
4.5 INJECTION, POWDER, LYOPHILIZED, FOR SOLUTION INTRAVENOUS EVERY 6 HOURS
Qty: 252 G | Refills: 0 | Status: SHIPPED | OUTPATIENT
Start: 2019-05-16 | End: 2019-05-30

## 2019-05-16 RX ADMIN — PIPERACILLIN AND TAZOBACTAM 3.38 G: 3; .375 INJECTION, POWDER, FOR SOLUTION INTRAVENOUS at 17:59

## 2019-05-16 RX ADMIN — AMLODIPINE BESYLATE 5 MG: 5 TABLET ORAL at 20:53

## 2019-05-16 RX ADMIN — PIPERACILLIN AND TAZOBACTAM 3.38 G: 3; .375 INJECTION, POWDER, FOR SOLUTION INTRAVENOUS at 08:35

## 2019-05-16 RX ADMIN — NADOLOL 20 MG: 20 TABLET ORAL at 20:53

## 2019-05-16 RX ADMIN — PIPERACILLIN AND TAZOBACTAM 3.38 G: 3; .375 INJECTION, POWDER, FOR SOLUTION INTRAVENOUS at 02:13

## 2019-05-16 RX ADMIN — SODIUM CHLORIDE SOLN NEBU 3% 4 ML: 3 NEBU SOLN at 20:20

## 2019-05-16 RX ADMIN — HYDROCHLOROTHIAZIDE 12.5 MG: 12.5 TABLET ORAL at 08:34

## 2019-05-16 RX ADMIN — Medication 2000 UNITS: at 08:34

## 2019-05-16 RX ADMIN — Medication 10 ML: at 02:14

## 2019-05-16 RX ADMIN — IPRATROPIUM BROMIDE AND ALBUTEROL SULFATE 1 AMPULE: .5; 3 SOLUTION RESPIRATORY (INHALATION) at 12:54

## 2019-05-16 RX ADMIN — Medication 250 MG: at 08:34

## 2019-05-16 RX ADMIN — POTASSIUM CHLORIDE 10 MEQ: 10 TABLET, EXTENDED RELEASE ORAL at 08:34

## 2019-05-16 RX ADMIN — LEVOTHYROXINE, LIOTHYRONINE 60 MG: 19; 4.5 TABLET ORAL at 06:18

## 2019-05-16 RX ADMIN — ISOSORBIDE MONONITRATE 30 MG: 30 TABLET ORAL at 08:34

## 2019-05-16 RX ADMIN — RANOLAZINE 500 MG: 500 TABLET, FILM COATED, EXTENDED RELEASE ORAL at 08:34

## 2019-05-16 RX ADMIN — ENOXAPARIN SODIUM 40 MG: 40 INJECTION SUBCUTANEOUS at 08:34

## 2019-05-16 RX ADMIN — IPRATROPIUM BROMIDE AND ALBUTEROL SULFATE 1 AMPULE: .5; 3 SOLUTION RESPIRATORY (INHALATION) at 20:19

## 2019-05-16 RX ADMIN — ASPIRIN 81 MG: 81 TABLET ORAL at 08:34

## 2019-05-16 RX ADMIN — SODIUM CHLORIDE SOLN NEBU 3% 4 ML: 3 NEBU SOLN at 08:52

## 2019-05-16 RX ADMIN — IPRATROPIUM BROMIDE AND ALBUTEROL SULFATE 1 AMPULE: .5; 3 SOLUTION RESPIRATORY (INHALATION) at 08:52

## 2019-05-16 RX ADMIN — Medication 10 ML: at 20:53

## 2019-05-16 RX ADMIN — RANOLAZINE 500 MG: 500 TABLET, FILM COATED, EXTENDED RELEASE ORAL at 20:53

## 2019-05-16 RX ADMIN — ALTEPLASE 1 MG: 2.2 INJECTION, POWDER, LYOPHILIZED, FOR SOLUTION INTRAVENOUS at 14:30

## 2019-05-16 RX ADMIN — BUDESONIDE 250 MCG: 0.25 SUSPENSION RESPIRATORY (INHALATION) at 20:19

## 2019-05-16 RX ADMIN — BUDESONIDE 250 MCG: 0.25 SUSPENSION RESPIRATORY (INHALATION) at 08:52

## 2019-05-16 RX ADMIN — IPRATROPIUM BROMIDE AND ALBUTEROL SULFATE 1 AMPULE: .5; 3 SOLUTION RESPIRATORY (INHALATION) at 17:15

## 2019-05-16 RX ADMIN — Medication 10 ML: at 08:35

## 2019-05-16 ASSESSMENT — PAIN SCALES - GENERAL
PAINLEVEL_OUTOF10: 0

## 2019-05-16 NOTE — PROGRESS NOTES
Dr. Wade Gavin was notified via perfect [employer] , that patient will need a script for zosyn as well as an order for the script to be over an hour instead of 4 hours Q 8 hours.

## 2019-05-16 NOTE — PLAN OF CARE
Problem: Safety:  Goal: Free from accidental physical injury  Description  Free from accidental physical injury  5/16/2019 1031 by Fernando Caceres RN  Outcome: Met This Shift  5/16/2019 0249 by Seth Rosales RN  Outcome: Met This Shift  Goal: Free from intentional harm  Description  Free from intentional harm  5/16/2019 0249 by Seth Rosales RN  Outcome: Met This Shift     Problem: Daily Care:  Goal: Daily care needs are met  Description  Daily care needs are met  5/16/2019 1031 by Fernando Caceres RN  Outcome: Met This Shift  5/16/2019 0249 by Seth Rosales RN  Outcome: Met This Shift     Problem: Pain:  Goal: Patient's pain/discomfort is manageable  Description  Patient's pain/discomfort is manageable  5/16/2019 1031 by Fernando Caceres RN  Outcome: Met This Shift  5/16/2019 0249 by Seth Rosales RN  Outcome: Met This Shift     Problem: Skin Integrity:  Goal: Skin integrity will stabilize  Description  Skin integrity will stabilize  5/16/2019 1031 by Fernando Caceres RN  Outcome: Met This Shift  5/16/2019 0249 by Seth Rosales RN  Outcome: Met This Shift     Problem: Breathing Pattern - Ineffective:  Goal: Ability to achieve and maintain a regular respiratory rate will improve  Description  Ability to achieve and maintain a regular respiratory rate will improve  5/16/2019 0249 by Seth Rosales RN  Outcome: Met This Shift     Problem: Falls - Risk of:  Goal: Will remain free from falls  Description  Will remain free from falls  5/16/2019 1031 by Fernando Caceres RN  Outcome: Met This Shift  5/16/2019 0249 by Seth Rosales RN  Outcome: Met This Shift

## 2019-05-16 NOTE — PROGRESS NOTES
Pulmonary [facility] / [facility]                             Pulmonary Consult/Progress Note :          Patient: Brett Lopez  MRN: [id number]  : 1940      Date of Admission: .2019 11:54 AM    Consulting Physician:Dr Lora         Reason for Consultation:Pneumonia /severe bronchectasis   CC : cough ,SOB   HPI:   SOB and cough has  improved a lot since he been on Zosyn and chest  Vest   Doing much better   Less cough   tolerated Zosyn very well   Still with ALVAREZ   No fever or chills       PHYSICAL EXAMINATION:     VITAL SIGNS:  /60   Pulse 58   Temp 97.6 °F (36.4 °C) (Temporal)   Resp 18   Ht 5' (1.524 m)   Wt 118 lb 9.6 oz (53.8 kg)   SpO2 94%   BMI 23.16 kg/m²   Wt Readings from Last 3 Encounters:   19 118 lb 9.6 oz (53.8 kg)   19 120 lb (54.4 kg)   19 125 lb (56.7 kg)     Temp Readings from Last 3 Encounters:   19 97.6 °F (36.4 °C) (Temporal)   19 97.7 °F (36.5 °C) (Oral)   19 97.4 °F (36.3 °C)     TMAX:  BP Readings from Last 3 Encounters:   19 118/60   19 134/61   19 104/60     Pulse Readings from Last 3 Encounters:   19 58   19 56   19 80           INTAKE/OUTPUTS:  I/O last 3 completed shifts:   In: 1060 [P.O.:960; IV Piggyback:100]  Out: 1300 [Urine:1300]    Intake/Output Summary (Last 24 hours) at 2019 1145  Last data filed at 2019 1026  Gross per 24 hour   Intake 1290 ml   Output 1300 ml   Net -10 ml       General Appearance: alert and oriented to person, place and time, well-developed and   well-nourished, in no acute distress   Eyes: pupils equal, round, and reactive to light, extraocular eye movements intact, conjunctivae normal and sclera anicteric   Neck: neck supple and non tender without mass, no thyromegaly, no thyroid nodules and no cervical adenopathy   Pulmonary/Chest:rhonchi    Cardiovascular: normal rate, regular rhythm, normal S1 and S2, no murmurs, rubs, clicks or gallops, distal pulses intact, no carotid bruits, no murmurs, no gallops, no carotid bruits and no JVD   Abdomen: obese, soft, non-tender, non-distended, normal bowel sounds, no masses or organomegaly   Extremities:No edema or cyanosis   Musculoskeletal: normal range of motion, no joint swelling, deformity or tenderness   Neurologic: reflexes normal and symmetric, no cranial nerve deficit noted    LABS/IMAGING:    CBC:  Lab Results   Component Value Date    WBC 8.4 05/15/2019    HGB 9.4 (L) 05/15/2019    HCT 29.1 (L) 05/15/2019    MCV 81.1 05/15/2019     (L) 05/15/2019    LYMPHOPCT 26.3 05/15/2019    RBC 3.59 05/15/2019    MCH 26.2 05/15/2019    MCHC 32.3 05/15/2019    RDW 14.2 05/15/2019    NEUTOPHILPCT 53.1 05/15/2019    MONOPCT 9.2 05/15/2019    BASOPCT 1.1 05/15/2019    NEUTROABS 4.43 05/15/2019    LYMPHSABS 2.20 05/15/2019    MONOSABS 0.77 05/15/2019    EOSABS 0.78 (H) 05/15/2019    BASOSABS 0.09 05/15/2019       Recent Labs     05/15/19  0416 05/13/19  1350 05/09/19  1200   WBC 8.4 9.8 9.5   HGB 9.4* 10.3* 11.0*   HCT 29.1* 32.7* 36.2   MCV 81.1 82.4 84.8   * 158 196       BMP:   Recent Labs     05/13/19  1350      K 3.8      CO2 23   BUN 27*   CREATININE 0.8       MG:   Lab Results   Component Value Date    MG 1.9 03/28/2011     Ca/Phos:   Lab Results   Component Value Date    CALCIUM 9.3 05/13/2019     Amylase:   Lab Results   Component Value Date    AMYLASE 56 03/28/2011     Lipase:   Lab Results   Component Value Date    LIPASE 24 03/28/2011     LIVER PROFILE:   Recent Labs     05/13/19  1350   AST 18   ALT 13   BILITOT 0.3   ALKPHOS 60       PT/INR: No results for input(s): PROTIME, INR in the last 72 hours. APTT: No results for input(s): APTT in the last 72 hours.     Cardiac Enzymes:  Lab Results   Component Value Date    CKTOTAL 32 (L) 03/28/2011    CKMB <0.2 03/28/2011    TROPONINI <0.01 03/28/2011                   PROBLEM LIST:  Patient Active Problem List

## 2019-05-16 NOTE — CARE COORDINATION
5/16/2019  Spoke to Linda Jonas from [employer] and faxed Prescription for home infusion. Patient does have full coverage. Discussed with patient and her .

## 2019-05-17 PROBLEM — D80.9 HUMORAL IMMUNODEFICIENCY (HCC): Status: ACTIVE | Noted: 2019-05-17

## 2019-05-17 LAB
LV EF: 55 %
LVEF MODALITY: NORMAL

## 2019-05-17 PROCEDURE — 6370000000 HC RX 637 (ALT 250 FOR IP): Performed by: INTERNAL MEDICINE

## 2019-05-17 PROCEDURE — 2140000000 HC CCU INTERMEDIATE R&B

## 2019-05-17 PROCEDURE — 6360000002 HC RX W HCPCS: Performed by: INTERNAL MEDICINE

## 2019-05-17 PROCEDURE — 2700000000 HC OXYGEN THERAPY PER DAY

## 2019-05-17 PROCEDURE — 6360000002 HC RX W HCPCS

## 2019-05-17 PROCEDURE — 2580000003 HC RX 258: Performed by: INTERNAL MEDICINE

## 2019-05-17 PROCEDURE — 94668 MNPJ CHEST WALL SBSQ: CPT

## 2019-05-17 PROCEDURE — 99233 SBSQ HOSP IP/OBS HIGH 50: CPT | Performed by: INTERNAL MEDICINE

## 2019-05-17 PROCEDURE — 93306 TTE W/DOPPLER COMPLETE: CPT

## 2019-05-17 PROCEDURE — 94640 AIRWAY INHALATION TREATMENT: CPT

## 2019-05-17 PROCEDURE — 99222 1ST HOSP IP/OBS MODERATE 55: CPT | Performed by: INTERNAL MEDICINE

## 2019-05-17 RX ORDER — SODIUM CHLORIDE 9 MG/ML
INJECTION, SOLUTION INTRAVENOUS CONTINUOUS
Status: CANCELLED | OUTPATIENT
Start: 2019-05-31

## 2019-05-17 RX ORDER — DIPHENHYDRAMINE HYDROCHLORIDE 50 MG/ML
50 INJECTION INTRAMUSCULAR; INTRAVENOUS ONCE
Status: CANCELLED | OUTPATIENT
Start: 2019-05-31

## 2019-05-17 RX ORDER — EPINEPHRINE 1 MG/ML
0.3 INJECTION, SOLUTION, CONCENTRATE INTRAVENOUS PRN
Status: CANCELLED | OUTPATIENT
Start: 2019-05-31

## 2019-05-17 RX ORDER — POTASSIUM CHLORIDE 29.8 MG/ML
20 INJECTION INTRAVENOUS ONCE
Status: COMPLETED | OUTPATIENT
Start: 2019-05-17 | End: 2019-05-17

## 2019-05-17 RX ORDER — METHYLPREDNISOLONE SODIUM SUCCINATE 125 MG/2ML
125 INJECTION, POWDER, LYOPHILIZED, FOR SOLUTION INTRAMUSCULAR; INTRAVENOUS ONCE
Status: CANCELLED | OUTPATIENT
Start: 2019-05-31

## 2019-05-17 RX ORDER — SODIUM CHLORIDE 0.9 % (FLUSH) 0.9 %
10 SYRINGE (ML) INJECTION PRN
Status: CANCELLED | OUTPATIENT
Start: 2019-05-31

## 2019-05-17 RX ORDER — SODIUM CHLORIDE 0.9 % (FLUSH) 0.9 %
5 SYRINGE (ML) INJECTION PRN
Status: CANCELLED | OUTPATIENT
Start: 2019-05-31

## 2019-05-17 RX ORDER — HEPARIN SODIUM (PORCINE) LOCK FLUSH IV SOLN 100 UNIT/ML 100 UNIT/ML
300 SOLUTION INTRAVENOUS EVERY 12 HOURS
Status: DISCONTINUED | OUTPATIENT
Start: 2019-05-17 | End: 2019-05-19 | Stop reason: HOSPADM

## 2019-05-17 RX ORDER — HEPARIN SODIUM (PORCINE) LOCK FLUSH IV SOLN 100 UNIT/ML 100 UNIT/ML
300 SOLUTION INTRAVENOUS PRN
Status: DISCONTINUED | OUTPATIENT
Start: 2019-05-17 | End: 2019-05-19 | Stop reason: HOSPADM

## 2019-05-17 RX ORDER — HEPARIN SODIUM (PORCINE) LOCK FLUSH IV SOLN 100 UNIT/ML 100 UNIT/ML
SOLUTION INTRAVENOUS
Status: COMPLETED
Start: 2019-05-17 | End: 2019-05-17

## 2019-05-17 RX ORDER — ACETAMINOPHEN 325 MG/1
650 TABLET ORAL ONCE
Status: CANCELLED | OUTPATIENT
Start: 2019-05-31

## 2019-05-17 RX ORDER — DIPHENHYDRAMINE HYDROCHLORIDE 50 MG/ML
25 INJECTION INTRAMUSCULAR; INTRAVENOUS ONCE
Status: CANCELLED
Start: 2019-05-31

## 2019-05-17 RX ORDER — FUROSEMIDE 10 MG/ML
20 INJECTION INTRAMUSCULAR; INTRAVENOUS ONCE
Status: COMPLETED | OUTPATIENT
Start: 2019-05-17 | End: 2019-05-17

## 2019-05-17 RX ORDER — HEPARIN SODIUM (PORCINE) LOCK FLUSH IV SOLN 100 UNIT/ML 100 UNIT/ML
500 SOLUTION INTRAVENOUS PRN
Status: CANCELLED | OUTPATIENT
Start: 2019-05-31

## 2019-05-17 RX ORDER — 0.9 % SODIUM CHLORIDE 0.9 %
10 VIAL (ML) INJECTION ONCE
Status: CANCELLED | OUTPATIENT
Start: 2019-05-31

## 2019-05-17 RX ADMIN — Medication 2000 UNITS: at 10:29

## 2019-05-17 RX ADMIN — HEPARIN 500 UNITS: 100 SYRINGE at 10:30

## 2019-05-17 RX ADMIN — IPRATROPIUM BROMIDE AND ALBUTEROL SULFATE 1 AMPULE: .5; 3 SOLUTION RESPIRATORY (INHALATION) at 11:58

## 2019-05-17 RX ADMIN — IPRATROPIUM BROMIDE AND ALBUTEROL SULFATE 1 AMPULE: .5; 3 SOLUTION RESPIRATORY (INHALATION) at 20:20

## 2019-05-17 RX ADMIN — Medication 10 ML: at 14:01

## 2019-05-17 RX ADMIN — SODIUM CHLORIDE SOLN NEBU 3% 4 ML: 3 NEBU SOLN at 20:20

## 2019-05-17 RX ADMIN — Medication 250 MG: at 10:29

## 2019-05-17 RX ADMIN — PIPERACILLIN AND TAZOBACTAM 3.38 G: 3; .375 INJECTION, POWDER, FOR SOLUTION INTRAVENOUS at 10:28

## 2019-05-17 RX ADMIN — IPRATROPIUM BROMIDE AND ALBUTEROL SULFATE 1 AMPULE: .5; 3 SOLUTION RESPIRATORY (INHALATION) at 07:51

## 2019-05-17 RX ADMIN — ASPIRIN 81 MG: 81 TABLET ORAL at 10:29

## 2019-05-17 RX ADMIN — IPRATROPIUM BROMIDE AND ALBUTEROL SULFATE 1 AMPULE: .5; 3 SOLUTION RESPIRATORY (INHALATION) at 16:04

## 2019-05-17 RX ADMIN — PIPERACILLIN AND TAZOBACTAM 3.38 G: 3; .375 INJECTION, POWDER, FOR SOLUTION INTRAVENOUS at 17:35

## 2019-05-17 RX ADMIN — Medication 10 ML: at 10:30

## 2019-05-17 RX ADMIN — HEPARIN 300 UNITS: 100 SYRINGE at 20:31

## 2019-05-17 RX ADMIN — RANOLAZINE 500 MG: 500 TABLET, FILM COATED, EXTENDED RELEASE ORAL at 20:30

## 2019-05-17 RX ADMIN — ROSUVASTATIN CALCIUM 5 MG: 5 TABLET, FILM COATED ORAL at 10:29

## 2019-05-17 RX ADMIN — FUROSEMIDE 20 MG: 10 INJECTION, SOLUTION INTRAMUSCULAR; INTRAVENOUS at 14:00

## 2019-05-17 RX ADMIN — BUDESONIDE 250 MCG: 0.25 SUSPENSION RESPIRATORY (INHALATION) at 20:21

## 2019-05-17 RX ADMIN — Medication 10 ML: at 15:39

## 2019-05-17 RX ADMIN — Medication 10 ML: at 20:32

## 2019-05-17 RX ADMIN — RANOLAZINE 500 MG: 500 TABLET, FILM COATED, EXTENDED RELEASE ORAL at 10:29

## 2019-05-17 RX ADMIN — LEVOTHYROXINE, LIOTHYRONINE 60 MG: 19; 4.5 TABLET ORAL at 06:54

## 2019-05-17 RX ADMIN — POTASSIUM CHLORIDE 10 MEQ: 10 TABLET, EXTENDED RELEASE ORAL at 10:29

## 2019-05-17 RX ADMIN — ISOSORBIDE MONONITRATE 30 MG: 30 TABLET ORAL at 10:29

## 2019-05-17 RX ADMIN — PIPERACILLIN AND TAZOBACTAM 3.38 G: 3; .375 INJECTION, POWDER, FOR SOLUTION INTRAVENOUS at 01:58

## 2019-05-17 RX ADMIN — SODIUM CHLORIDE SOLN NEBU 3% 4 ML: 3 NEBU SOLN at 07:51

## 2019-05-17 RX ADMIN — POTASSIUM CHLORIDE 20 MEQ: 29.8 INJECTION, SOLUTION INTRAVENOUS at 15:39

## 2019-05-17 RX ADMIN — AMLODIPINE BESYLATE 5 MG: 5 TABLET ORAL at 20:30

## 2019-05-17 RX ADMIN — NADOLOL 20 MG: 20 TABLET ORAL at 20:31

## 2019-05-17 RX ADMIN — HYDROCHLOROTHIAZIDE 12.5 MG: 12.5 TABLET ORAL at 10:29

## 2019-05-17 RX ADMIN — ENOXAPARIN SODIUM 40 MG: 40 INJECTION SUBCUTANEOUS at 10:30

## 2019-05-17 RX ADMIN — BUDESONIDE 250 MCG: 0.25 SUSPENSION RESPIRATORY (INHALATION) at 07:52

## 2019-05-17 ASSESSMENT — PAIN SCALES - GENERAL
PAINLEVEL_OUTOF10: 0

## 2019-05-17 NOTE — PROGRESS NOTES
Subjective: The patient is awake and alert. No problems overnight. Denies chest pain, angina, and dyspnea. Denies abdominal pain. Tolerating diet. No nausea or vomiting. She still feels like she is not feeling well. Gets hypoxic with any exertion. Objective:  Pt is aox3 in nad  /62   Pulse 71   Temp 97.1 °F (36.2 °C) (Temporal)   Resp 20   Ht 5' (1.524 m)   Wt 121 lb 6.4 oz (55.1 kg)   SpO2 95%   BMI 23.71 kg/m²   HEENT no adenopathy no bruits  Heart:  RRR, no murmurs, gallops, or rubs.   Lungs:  Decreased bs in the bases  Abd: bowel sounds present, nontender, nondistended, no masses  Extrem:  No clubbing, cyanosis, or edema  WBC/Hgb/Hct/Plts:  8.4/9.4/29.1/124 (05/15 2371) basic metabolic panel     Assessment:    Patient Active Problem List   Diagnosis    GERD (gastroesophageal reflux disease)    CAD (coronary artery disease)    HTN (hypertension)    DJD (degenerative joint disease) of knee    Allergy to sulfa drugs    Allergy to tetracycline    Presence of drug coated stent in LAD coronary artery    Dyspnea    Other diseases of lung, not elsewhere classified    Bronchiectasis with acute exacerbation ([address]    Bronchiectasis ([address]    Moderate protein-calorie malnutrition ([address]       Plan:  ? Immunotherapy        Perri Rincon  7:15 AM  5/17/2019

## 2019-05-17 NOTE — PROGRESS NOTES
CC- weakness  Subjective: The patient is awake and alert. Tolerating medications. Reports no side effects. Afebrile. 10 ROS otherwise negative unless otherwise specified above. Objective:    Vitals:    05/17/19 0811   BP: 129/70   Pulse: 58   Resp: 18   Temp: 98.1 °F (36.7 °C)   SpO2: 96%       General Appearance:    Awake, alert , no acute distress. HEENT:    Normocephalic,PERRL,neck supple, no JVD, mucosa moist, no thrush   Lungs:     Clear to auscultation bilaterally, no wheeze , crackles   Heart:    Regular rate and rhythm, no murmur   Abdomen:     Soft, non-tender, not distended  bowel sounds present,   Extremities:   No edema,no open wound,no erythema, non  tender   Skin:   no rashes or lesions   CBC+dif:  Reviewed and trend followed    Radiology:  Noted    Microbiology:  Pending    Assessment:  · Acute bronchiectasis with MSSA and pseudomonas in BAL  · Intolerance to cefepime  · Anemia - beta lactam induced BM suppression?     Plan:    ·  iv zosyn 4.5 g q6h for 2 more weeks  · Has a PICC line already  · Scripts in chart  · F/u in 2 weeks with me      Electronically signed by Dale Umanzor MD on 5/17/2019 at 12:25 PM

## 2019-05-17 NOTE — PROGRESS NOTES
29.1 (L) 05/15/2019    MCV 81.1 05/15/2019     (L) 05/15/2019    LYMPHOPCT 26.3 05/15/2019    RBC 3.59 05/15/2019    MCH 26.2 05/15/2019    MCHC 32.3 05/15/2019    RDW 14.2 05/15/2019    NEUTOPHILPCT 53.1 05/15/2019    MONOPCT 9.2 05/15/2019    BASOPCT 1.1 05/15/2019    NEUTROABS 4.43 05/15/2019    LYMPHSABS 2.20 05/15/2019    MONOSABS 0.77 05/15/2019    EOSABS 0.78 (H) 05/15/2019    BASOSABS 0.09 05/15/2019       Recent Labs     05/15/19  0416 05/13/19  1350 05/09/19  1200   WBC 8.4 9.8 9.5   HGB 9.4* 10.3* 11.0*   HCT 29.1* 32.7* 36.2   MCV 81.1 82.4 84.8   * 158 196       BMP:   No results for input(s): NA, K, CL, CO2, PHOS, BUN, CREATININE in the last 72 hours. Invalid input(s): CA    MG:   Lab Results   Component Value Date    MG 1.9 03/28/2011     Ca/Phos:   Lab Results   Component Value Date    CALCIUM 9.3 05/13/2019     Amylase:   Lab Results   Component Value Date    AMYLASE 56 03/28/2011     Lipase:   Lab Results   Component Value Date    LIPASE 24 03/28/2011     LIVER PROFILE:   No results for input(s): AST, ALT, LIPASE, BILIDIR, BILITOT, ALKPHOS in the last 72 hours. Invalid input(s): AMYLASE,  ALB    PT/INR: No results for input(s): PROTIME, INR in the last 72 hours. APTT: No results for input(s): APTT in the last 72 hours.     Cardiac Enzymes:  Lab Results   Component Value Date    CKTOTAL 32 (L) 03/28/2011    CKMB <0.2 03/28/2011    TROPONINI <0.01 03/28/2011                   PROBLEM LIST:  Patient Active Problem List   Diagnosis    GERD (gastroesophageal reflux disease)    CAD (coronary artery disease)    HTN (hypertension)    DJD (degenerative joint disease) of knee    Allergy to sulfa drugs    Allergy to tetracycline    Presence of drug coated stent in LAD coronary artery    Dyspnea    Other diseases of lung, not elsewhere classified    Bronchiectasis with acute exacerbation (HCC)    Bronchiectasis ([address]    Moderate protein-calorie malnutrition (HCC)    Humoral immunodeficiency ([address]    Nonfamilial hypogammaglobulinemia (HCC)                ASSESSMENT:  1-Bronchiectasis with staph and pseudomonas and candida ,caindida could be contamination  2-Severe allergic rhinitis with sinusitis  3- Hypogammaglobulinemia  4- Pseudomonas infection/colonization  5-hoarseness unknown etiology, was attributed to tobramycin inhaler use          6- ILD ? etiology         PLAN:  *-continue  zosyn ,has PICC line ,dov need to increase do to every  6 h,defer to ID   *-Chest vest  *- pending 2 d eco  *-Elevated BNP and will give dose of lasix   *-Mucomyst nebulizer  *incentive  of spirometry  *_Flutter valve  *-ICS   *- BD  Weight loss work up as per primary   2 d echo pending   BNP>1000  D Dimer negative   Appreciate Hematology input   Will watch over next 1-2 days  Arrange for OP Zosyn   BD     Luisa Rodriguez  Pulmonary/Critical care [address]     NOTE: This report was transcribed using voice recognition software. Every effort was made to ensure accuracy; however, inadvertent computerized transcription errors may be present.

## 2019-05-17 NOTE — PLAN OF CARE
Problem: Safety:  Goal: Free from accidental physical injury  Description  Free from accidental physical injury  Outcome: Met This Shift     Problem: Safety:  Goal: Free from intentional harm  Description  Free from intentional harm  Outcome: Met This Shift     Problem: Daily Care:  Goal: Daily care needs are met  Description  Daily care needs are met  Outcome: Met This Shift     Problem: Pain:  Goal: Patient's pain/discomfort is manageable  Description  Patient's pain/discomfort is manageable  Outcome: Met This Shift     Problem: Breathing Pattern - Ineffective:  Goal: Ability to achieve and maintain a regular respiratory rate will improve  Description  Ability to achieve and maintain a regular respiratory rate will improve  Outcome: Met This Shift     Problem: Falls - Risk of:  Goal: Will remain free from falls  Description  Will remain free from falls  Outcome: Met This Shift     Problem: Skin Integrity:  Goal: Skin integrity will stabilize  Description  Skin integrity will stabilize  Outcome: Not Met This Shift

## 2019-05-17 NOTE — CARE COORDINATION
5/17/2019  Spoke to Jorge Hernandez from [address] [place] [address] who requested that their on call nurse be contacted as soon as patient's discharge is noted so they can coordinate time for home infusions. [phone].

## 2019-05-17 NOTE — CONSULTS
Inpatient consult to Oncology  Consult performed by: Juli Sweet MD  Consult ordered by: Shaka Garcia MD        [address] [place] [address]  Dept: [phone]  Loc: [phone]  Attending Consult Note      Reason for Visit:   Hypogammaglobulinemia. PCP:  Tesha Hoang MD    History of Present Illness: The patient is a pleasant 69-year-old lady with a PMH significant for bronchiectasis, recurrent sinopulmonary infections, who was diagnosed with common variable immunodeficiency in 2016, her insurance denied IVIG treatment. The patient had a bronch done 3/13/2019 revealing purulent secretions, the patient was being treated as OP with Cefepime, for MSSA and pseudomonas bronchiectasis, she was getting weak and had a poor appetite, she was admitted to [facility] (1-), she is on Zosyn, Pulmonary and ID are following. Review of Systems;  CONSTITUTIONAL: No fever, chills. Decreased appetite and energy level. ENMT: Eyes: No diplopia; Nose: No epistaxis. Mouth: No sore throat. Pos for sinus drainage. RESPIRATORY: pos for shortness of breath and cough. CARDIOVASCULAR: No chest pain, palpitations. GASTROINTESTINAL: pos for nausea, no abdominal pain, change in her bowel habits. GENITOURINARY: No dysuria, urinary frequency, hematuria. NEURO: No syncope, presyncope, headache.   Remainder:  ROS NEGATIVE    Past Medical History:      Diagnosis Date    Arthritis     [facility]     with SOB    Bronchiectasis ([facility]     CAD (coronary artery disease)     (11/13/14)- saw Chung Orlando for yearly visit 3/2014    Cataract     bilaterally    Gastritis     GERD (gastroesophageal reflux disease)     Heart block 2004    2 heart stents inserted    Hyperlipidemia     denies     Prolonged emergence from general anesthesia     Thyroid disease     hypo    Vitamin D deficiency      Patient Active Problem List   Diagnosis    GERD (gastroesophageal reflux disease)    CAD (coronary artery disease)    HTN (hypertension)    DJD (degenerative joint disease) of knee    Allergy to sulfa drugs    Allergy to tetracycline    Presence of drug coated stent in LAD coronary artery    Dyspnea    Other diseases of lung, not elsewhere classified    Bronchiectasis with acute exacerbation ([address]    Bronchiectasis ([address]    Moderate protein-calorie malnutrition ([address]        Past Surgical History:      Procedure Laterality Date    BRONCHOSCOPY N/A 11/18/14    BRONCHOSCOPY BAL & BRUSHING    BRONCHOSCOPY N/A 3/13/2019    BRONCHOSCOPY ALVEOLAR LAVAGE performed by Mercedes Cervantes MD at [address] [place] [address]  2008    CATARACT REMOVAL Bilateral 2006    COLONOSCOPY  2006    neg    COLONOSCOPY  10/23/2015    CORONARY ANGIOPLASTY WITH STENT PLACEMENT  2004    2 stents    ENDOSCOPY, COLON, DIAGNOSTIC      ENDOSCOPY, COLON, DIAGNOSTIC  10/23/2015    HAND TENDON SURGERY  2003    left thumb    JOINT REPLACEMENT Right 2012    knee    KNEE ARTHROSCOPY  2005    right    KNEE SURGERY  5/14/2012    right knee replacement    NOSE SURGERY  2014    sinus    OTHER SURGICAL HISTORY Left     thumb arthritis surgery    PICC LINE INSERTION NURSE  5/7/2019         UPPER GASTROINTESTINAL ENDOSCOPY  2011    neg       Family History:  Family History   Problem Relation Age of Onset    Cancer Paternal Grandfather         stomach    Diabetes Maternal Grandmother     Other Father         no clinical problems dies at age 80 years   Julian [place] Stroke Mother     Osteoporosis Mother     Alzheimer's Disease Brother         in nursing home       Medications:  Reviewed and reconciled.     Social History:  Social History     Socioeconomic History    Marital status:      Spouse name: Not on file    Number of children: Not on file    Years of education: Not on file    Highest education level: Not on file   Occupational History    Not on file   Social Needs    Financial resource strain: Not on file    Food insecurity:     Worry: Not on file     Inability: Not on file    Transportation needs:     Medical: Not on file     Non-medical: Not on file   Tobacco Use    Smoking status: Former Smoker     Packs/day: 1.00     Years: 25.00     Pack years: 25.00     Last attempt to quit: 10/19/1984     Years since quittin.5    Smokeless tobacco: Never Used   Substance and Sexual Activity    Alcohol use: No     Alcohol/week: 0.0 oz    Drug use: No    Sexual activity: Not Currently   Lifestyle    Physical activity:     Days per week: Not on file     Minutes per session: Not on file    Stress: Not on file   Relationships    Social connections:     Talks on phone: Not on file     Gets together: Not on file     Attends Jewish service: Not on file     Active member of club or organization: Not on file     Attends meetings of clubs or organizations: Not on file     Relationship status: Not on file    Intimate partner violence:     Fear of current or ex partner: Not on file     Emotionally abused: Not on file     Physically abused: Not on file     Forced sexual activity: Not on file   Other Topics Concern    Not on file   Social History Narrative    Not on file       Allergies: Allergies   Allergen Reactions    Metoprolol Shortness Of Breath    Tetracyclines & Related Other (See Comments)     Large blotches and reddness and burning  Face and hands    Sulfa Antibiotics Itching       Physical Exam:  /62   Pulse 71   Temp 97.1 °F (36.2 °C) (Temporal)   Resp 20   Ht 5' (1.524 m)   Wt 121 lb 6.4 oz (55.1 kg)   SpO2 95%   BMI 23.71 kg/m²   GENERAL: Alert, oriented x 3, not in acute distress. HEENT: PERRLA; EOMI. Oropharynx clear. NECK: Supple. No palpable cervical or supraclavicular lymphadenopathy. LUNGS: Decreased air entry bilaterally. CARDIOVASCULAR: Regular rate. No murmurs, rubs or gallops. ABDOMEN: Soft. Non-tender, non-distended.  Positive bowel

## 2019-05-18 LAB
ANION GAP SERPL CALCULATED.3IONS-SCNC: 11 MMOL/L (ref 7–16)
BUN BLDV-MCNC: 18 MG/DL (ref 8–23)
CALCIUM SERPL-MCNC: 9.5 MG/DL (ref 8.6–10.2)
CHLORIDE BLD-SCNC: 99 MMOL/L (ref 98–107)
CO2: 30 MMOL/L (ref 22–29)
CREAT SERPL-MCNC: 0.7 MG/DL (ref 0.5–1)
EKG ATRIAL RATE: 59 BPM
EKG P AXIS: 53 DEGREES
EKG P-R INTERVAL: 194 MS
EKG Q-T INTERVAL: 436 MS
EKG QRS DURATION: 74 MS
EKG QTC CALCULATION (BAZETT): 431 MS
EKG R AXIS: -4 DEGREES
EKG T AXIS: 37 DEGREES
EKG VENTRICULAR RATE: 59 BPM
FERRITIN: 134 NG/ML
FOLATE: >20 NG/ML (ref 4.8–24.2)
GFR AFRICAN AMERICAN: >60
GFR NON-AFRICAN AMERICAN: >60 ML/MIN/1.73
GLUCOSE BLD-MCNC: 100 MG/DL (ref 74–99)
HCT VFR BLD CALC: 34.8 % (ref 34–48)
IGA: 170 MG/DL (ref 70–400)
IGG: 741 MG/DL (ref 700–1600)
IGM: 67 MG/DL (ref 40–230)
IMMATURE RETIC FRACT: 15.9 % (ref 3–15.9)
IRON SATURATION: 17 % (ref 15–50)
IRON: 53 MCG/DL (ref 37–145)
POTASSIUM SERPL-SCNC: 3.6 MMOL/L (ref 3.5–5)
RETIC HGB EQUIVALENT: 29 PG (ref 28.2–36.6)
RETICULOCYTE ABSOLUTE COUNT: 0.11 E12/L
RETICULOCYTE COUNT PCT: 2.5 % (ref 0.4–1.9)
SODIUM BLD-SCNC: 140 MMOL/L (ref 132–146)
TOTAL IRON BINDING CAPACITY: 307 MCG/DL (ref 250–450)
VITAMIN B-12: 1237 PG/ML (ref 211–946)

## 2019-05-18 PROCEDURE — 85045 AUTOMATED RETICULOCYTE COUNT: CPT

## 2019-05-18 PROCEDURE — 2700000000 HC OXYGEN THERAPY PER DAY

## 2019-05-18 PROCEDURE — 6370000000 HC RX 637 (ALT 250 FOR IP): Performed by: INTERNAL MEDICINE

## 2019-05-18 PROCEDURE — 6360000002 HC RX W HCPCS: Performed by: INTERNAL MEDICINE

## 2019-05-18 PROCEDURE — 99233 SBSQ HOSP IP/OBS HIGH 50: CPT | Performed by: INTERNAL MEDICINE

## 2019-05-18 PROCEDURE — 2580000003 HC RX 258: Performed by: INTERNAL MEDICINE

## 2019-05-18 PROCEDURE — 99223 1ST HOSP IP/OBS HIGH 75: CPT | Performed by: INTERNAL MEDICINE

## 2019-05-18 PROCEDURE — 83540 ASSAY OF IRON: CPT

## 2019-05-18 PROCEDURE — 36415 COLL VENOUS BLD VENIPUNCTURE: CPT

## 2019-05-18 PROCEDURE — 83550 IRON BINDING TEST: CPT

## 2019-05-18 PROCEDURE — 94640 AIRWAY INHALATION TREATMENT: CPT

## 2019-05-18 PROCEDURE — 82728 ASSAY OF FERRITIN: CPT

## 2019-05-18 PROCEDURE — 80048 BASIC METABOLIC PNL TOTAL CA: CPT

## 2019-05-18 PROCEDURE — 82746 ASSAY OF FOLIC ACID SERUM: CPT

## 2019-05-18 PROCEDURE — 82787 IGG 1 2 3 OR 4 EACH: CPT

## 2019-05-18 PROCEDURE — 93005 ELECTROCARDIOGRAM TRACING: CPT | Performed by: INTERNAL MEDICINE

## 2019-05-18 PROCEDURE — 2140000000 HC CCU INTERMEDIATE R&B

## 2019-05-18 PROCEDURE — 93010 ELECTROCARDIOGRAM REPORT: CPT | Performed by: INTERNAL MEDICINE

## 2019-05-18 PROCEDURE — 82784 ASSAY IGA/IGD/IGG/IGM EACH: CPT

## 2019-05-18 PROCEDURE — 84165 PROTEIN E-PHORESIS SERUM: CPT

## 2019-05-18 PROCEDURE — 94668 MNPJ CHEST WALL SBSQ: CPT

## 2019-05-18 PROCEDURE — 82607 VITAMIN B-12: CPT

## 2019-05-18 RX ORDER — MIRTAZAPINE 15 MG/1
7.5 TABLET, FILM COATED ORAL NIGHTLY
Status: DISCONTINUED | OUTPATIENT
Start: 2019-05-18 | End: 2019-05-19 | Stop reason: HOSPADM

## 2019-05-18 RX ADMIN — SODIUM CHLORIDE SOLN NEBU 3% 4 ML: 3 NEBU SOLN at 21:20

## 2019-05-18 RX ADMIN — IPRATROPIUM BROMIDE AND ALBUTEROL SULFATE 1 AMPULE: .5; 3 SOLUTION RESPIRATORY (INHALATION) at 08:51

## 2019-05-18 RX ADMIN — Medication 10 ML: at 05:49

## 2019-05-18 RX ADMIN — Medication 250 MG: at 09:18

## 2019-05-18 RX ADMIN — PIPERACILLIN AND TAZOBACTAM 3.38 G: 3; .375 INJECTION, POWDER, FOR SOLUTION INTRAVENOUS at 17:57

## 2019-05-18 RX ADMIN — HYDROCHLOROTHIAZIDE 12.5 MG: 12.5 TABLET ORAL at 09:18

## 2019-05-18 RX ADMIN — HEPARIN 300 UNITS: 100 SYRINGE at 05:57

## 2019-05-18 RX ADMIN — BUDESONIDE 250 MCG: 0.25 SUSPENSION RESPIRATORY (INHALATION) at 08:51

## 2019-05-18 RX ADMIN — LEVOTHYROXINE, LIOTHYRONINE 60 MG: 19; 4.5 TABLET ORAL at 05:47

## 2019-05-18 RX ADMIN — Medication 10 ML: at 21:49

## 2019-05-18 RX ADMIN — HEPARIN 300 UNITS: 100 SYRINGE at 21:50

## 2019-05-18 RX ADMIN — ASPIRIN 81 MG: 81 TABLET ORAL at 09:18

## 2019-05-18 RX ADMIN — IPRATROPIUM BROMIDE AND ALBUTEROL SULFATE 1 AMPULE: .5; 3 SOLUTION RESPIRATORY (INHALATION) at 12:55

## 2019-05-18 RX ADMIN — ISOSORBIDE MONONITRATE 30 MG: 30 TABLET ORAL at 09:18

## 2019-05-18 RX ADMIN — ENOXAPARIN SODIUM 40 MG: 40 INJECTION SUBCUTANEOUS at 09:20

## 2019-05-18 RX ADMIN — RANOLAZINE 500 MG: 500 TABLET, FILM COATED, EXTENDED RELEASE ORAL at 09:18

## 2019-05-18 RX ADMIN — Medication 10 ML: at 09:19

## 2019-05-18 RX ADMIN — NADOLOL 20 MG: 20 TABLET ORAL at 20:32

## 2019-05-18 RX ADMIN — MIRTAZAPINE 7.5 MG: 15 TABLET, FILM COATED ORAL at 22:51

## 2019-05-18 RX ADMIN — RANOLAZINE 500 MG: 500 TABLET, FILM COATED, EXTENDED RELEASE ORAL at 20:32

## 2019-05-18 RX ADMIN — POTASSIUM CHLORIDE 10 MEQ: 10 TABLET, EXTENDED RELEASE ORAL at 09:18

## 2019-05-18 RX ADMIN — Medication 2000 UNITS: at 09:18

## 2019-05-18 RX ADMIN — AMLODIPINE BESYLATE 5 MG: 5 TABLET ORAL at 20:32

## 2019-05-18 RX ADMIN — IPRATROPIUM BROMIDE AND ALBUTEROL SULFATE 1 AMPULE: .5; 3 SOLUTION RESPIRATORY (INHALATION) at 17:34

## 2019-05-18 RX ADMIN — SODIUM CHLORIDE SOLN NEBU 3% 4 ML: 3 NEBU SOLN at 08:51

## 2019-05-18 RX ADMIN — IPRATROPIUM BROMIDE AND ALBUTEROL SULFATE 1 AMPULE: .5; 3 SOLUTION RESPIRATORY (INHALATION) at 21:20

## 2019-05-18 RX ADMIN — HEPARIN 300 UNITS: 100 SYRINGE at 09:19

## 2019-05-18 RX ADMIN — BUDESONIDE 250 MCG: 0.25 SUSPENSION RESPIRATORY (INHALATION) at 21:21

## 2019-05-18 RX ADMIN — PIPERACILLIN AND TAZOBACTAM 3.38 G: 3; .375 INJECTION, POWDER, FOR SOLUTION INTRAVENOUS at 09:33

## 2019-05-18 RX ADMIN — PIPERACILLIN AND TAZOBACTAM 3.38 G: 3; .375 INJECTION, POWDER, FOR SOLUTION INTRAVENOUS at 01:46

## 2019-05-18 RX ADMIN — Medication 10 ML: at 01:46

## 2019-05-18 ASSESSMENT — PAIN SCALES - GENERAL
PAINLEVEL_OUTOF10: 0
PAINLEVEL_OUTOF10: 0

## 2019-05-18 NOTE — PLAN OF CARE
Patient's breathing remained even and unlabored during shift.  Jose Eduardo Benavides  5/18/2019    Problem: Breathing Pattern - Ineffective:  Goal: Ability to achieve and maintain a regular respiratory rate will improve  Description  Ability to achieve and maintain a regular respiratory rate will improve  5/18/2019 0442 by Jose Eduardo Benavides RN  Outcome: Met This Shift

## 2019-05-18 NOTE — PROGRESS NOTES
Subjective: PATIENT SEEN THIS AM FOR FOLLOW UP DOING OK WEAK TIRED AND SOB  APPETITE POOR     The patient is awake and alert. No problems overnight.       ROS:  Constitutional: negative except for fatigue and malaise  Respiratory: negative except for dyspnea on exertion    Objective:      mirtazapine (REMERON) tablet 7.5 mg Nightly   heparin flush 100 UNIT/ML injection 300 Units PRN   heparin flush 100 UNIT/ML injection 300 Units Q12H   perflutren lipid microspheres (DEFINITY) injection 1.65 mg ONCE PRN   acetaminophen (TYLENOL) tablet 650 mg Q4H PRN   amLODIPine (NORVASC) tablet 5 mg Nightly   aspirin EC tablet 81 mg Daily   vitamin D tablet 2,000 Units Daily   hydrochlorothiazide (HYDRODIURIL) tablet 12.5 mg Daily   isosorbide mononitrate (IMDUR) extended release tablet 30 mg Daily   magnesium gluconate (MAGONATE) tablet 250 mg Daily   nadolol (CORGARD) tablet 20 mg Nightly   potassium chloride (KLOR-CON M) extended release tablet 10 mEq Daily   ranolazine (RANEXA) extended release tablet 500 mg BID   rosuvastatin (CRESTOR) tablet 5 mg Once per day on Mon Wed Fri   thyroid (ARMOUR) tablet 60 mg Daily   sodium chloride flush 0.9 % injection 10 mL 2 times per day   sodium chloride flush 0.9 % injection 10 mL PRN   magnesium hydroxide (MILK OF MAGNESIA) 400 MG/5ML suspension 30 mL Daily PRN   ondansetron (ZOFRAN) injection 4 mg Q6H PRN   enoxaparin (LOVENOX) injection 40 mg Daily   ipratropium-albuterol (DUONEB) nebulizer solution 1 ampule Q4H WA   piperacillin-tazobactam (ZOSYN) 3.375 g in dextrose 5 % 100 mL IVPB (mini-bag) Q8H   sodium chloride (Inhalant) 3 % nebulizer solution 4 mL BID   budesonide (PULMICORT) nebulizer suspension 250 mcg BID         BP (!) 140/77   Pulse 57   Temp 97.8 °F (36.6 °C) (Temporal)   Resp 18   Ht 5' (1.524 m)   Wt 121 lb 6.4 oz (55.1 kg)   SpO2 94%   BMI 23.71 kg/m²     Lungs:  Breath sounds: diminished breath sounds- left base  Heart:  Heart regular rate and rhythm  Abdomen:

## 2019-05-18 NOTE — CONSULTS
Consults       INPATIENT CARDIOLOGY CONSULT    Name: Dorcas Lal    Age: 77 y.o. Date of Admission: 5/14/2019 11:54 AM    Date of Service: 5/18/2019    Reason for Consultation: Coronary atherosclerosis with stable angina, and the basis of increased myocardial oxygen demands, bronchiectasis with associated chronic hypoxic respiratory failure    Referring Physician: Hope Gongora M.D. History of Present Illness: The patient is a 68-year-old white female known to [facility] with primary cardiovascular care provided by Isaac Camarillo. She has a known history of coronary atherosclerosis with remote coronary intervention of her left anterior descending most recently in 2005 and a most recent objective assessment with perfusion imaging in January, 2018 demonstrating no evidence of perfusion abnormalities with normal left ventricular systolic function and suggesting a low risk of acute ischemic events. She additionally has a history of bronchiectasis with chronic Pseudomonas colonization and/or infection as well as that of chronic hypoxic respiratory failure. Prior to hospitalization, her most recent echocardiogram demonstrated evidence of a normal size left chamber with normal left systolic function and stage II diastolic dysfunction with borderline left atrial enlargement and borderline right ventricular systolic pressures of approximately 35 mmHg. Her present hospitalization was prompted by the development of increasing dyspnea in the face of a cough occasionally associated with that of purulent sputum production suggestive of exacerbation of her bronchiectasis with management throughout her hospitalization in this regard. During hospitalization she becomes significantly hypoxic with activity in spite of her supplemental oxygen with occasional mild exertional angina noted at that time.  No additional clinical manifestations of decompensated left ventricular systolic dysfunction or clinical volume     sinus    OTHER SURGICAL HISTORY Left     thumb arthritis surgery    PICC LINE INSERTION NURSE  2019         UPPER GASTROINTESTINAL ENDOSCOPY  2011    neg       Family History:  Family History   Problem Relation Age of Onset    Cancer Paternal Grandfather         stomach    Diabetes Maternal Grandmother     Other Father         no clinical problems dies at age 80 years   Sami Floyd Stroke Mother     Osteoporosis Mother     Alzheimer's Disease Brother         in nursing home       Social History:  Social History     Socioeconomic History    Marital status:      Spouse name: Not on file    Number of children: Not on file    Years of education: Not on file    Highest education level: Not on file   Occupational History    Not on file   Social Needs    Financial resource strain: Not on file    Food insecurity:     Worry: Not on file     Inability: Not on file    Transportation needs:     Medical: Not on file     Non-medical: Not on file   Tobacco Use    Smoking status: Former Smoker     Packs/day: 1.00     Years: 25.00     Pack years: 25.00     Last attempt to quit: 10/19/1984     Years since quittin.6    Smokeless tobacco: Never Used   Substance and Sexual Activity    Alcohol use: No     Alcohol/week: 0.0 oz    Drug use: No    Sexual activity: Not Currently   Lifestyle    Physical activity:     Days per week: Not on file     Minutes per session: Not on file    Stress: Not on file   Relationships    Social connections:     Talks on phone: Not on file     Gets together: Not on file     Attends Yarsani service: Not on file     Active member of club or organization: Not on file     Attends meetings of clubs or organizations: Not on file     Relationship status: Not on file    Intimate partner violence:     Fear of current or ex partner: Not on file     Emotionally abused: Not on file     Physically abused: Not on file     Forced sexual activity: Not on file   Other Topics Concern    Not on file   Social History Narrative    Not on file       Allergies: Allergies   Allergen Reactions    Metoprolol Shortness Of Breath    Tetracyclines & Related Other (See Comments)     Large blotches and reddness and burning  Face and hands    Sulfa Antibiotics Itching       Home Medications:  Prior to Admission medications    Medication Sig Start Date End Date Taking?  Authorizing Provider   piperacillin-tazobactam (ZOSYN) 4.5 (4-0.5) g injection Infuse 4.5 g intravenously every 6 hours for 14 days 5/16/19 5/30/19 Yes Analia Portillo MD   rosuvastatin (CRESTOR) 5 MG tablet Take 1 tablet by mouth daily Indications: 3 times a week 4/24/19  Yes Solis Rice MD   hydrochlorothiazide (HYDRODIURIL) 12.5 MG tablet Take 1 tablet by mouth daily 4/24/19  Yes Solis Rice MD   thyroid (ARMOUR THYROID) 60 MG tablet Take 1 tablet by mouth daily 4/24/19  Yes Solis Rice MD   ranolazine (RANEXA) 500 MG extended release tablet Take 1 tablet by mouth 2 times daily 1/31/19  Yes Lydia Diaz MD   nadolol (CORGARD) 20 MG tablet Take 1 tablet by mouth daily  Patient taking differently: Take 20 mg by mouth nightly  12/7/18  Yes Lydia Diaz MD   isosorbide mononitrate (IMDUR) 30 MG extended release tablet Take 1 tablet by mouth daily 11/29/18  Yes Lydia Diaz MD   aspirin EC 81 MG EC tablet Take 1 tablet by mouth daily 10/30/18  Yes Lydia Diaz MD   Probiotic Product (PROBIOTIC ADVANCED PO) Take by mouth   Yes Historical Provider, MD   azelastine (ASTELIN) 0.1 % nasal spray 1 spray by Nasal route 2 times daily Use in each nostril as directed 10/9/18  Yes Christina Vaughan DO   amLODIPine (NORVASC) 5 MG tablet Take 1 tablet by mouth nightly 7/3/18  Yes Lydia Diaz MD   nitroGLYCERIN (NITROSTAT) 0.4 MG SL tablet Place 1 tablet under the tongue every 5 minutes as needed for Chest pain (has seen PCP having usig last Nitro (10/2014)) 1/17/18  Yes Lydia Diaz MD   Magnesium 100 MG CAPS Take 250 mg by mouth daily    Yes Historical Provider, MD   Multiple Vitamins-Minerals (THERAPEUTIC MULTIVITAMIN-MINERALS) tablet Take 1 tablet by mouth daily   Yes Historical Provider, MD   Cholecalciferol (VITAMIN D) 2000 UNITS CAPS capsule Take 10,000 capsules by mouth daily    Yes Historical Provider, MD   Biotin 5000 MCG CAPS Take  by mouth daily. Yes Historical Provider, MD   Coenzyme Q10 (CO Q 10 PO) Take 200 mg by mouth daily. Yes Historical Provider, MD   OMEGA 3 1000 MG CAPS Take  by mouth daily.      Yes Historical Provider, MD   potassium chloride (KLOR-CON) 10 MEQ extended release tablet Take 10 mEq by mouth daily With Lasix as needed for swelling    Historical Provider, MD       Current Medications:  Current Facility-Administered Medications   Medication Dose Route Frequency Provider Last Rate Last Dose    mirtazapine (REMERON) tablet 7.5 mg  7.5 mg Oral Nightly Abner Lora MD        heparin flush 100 UNIT/ML injection 300 Units  300 Units Intracatheter PRN Abner Lora MD   300 Units at 05/18/19 0557    heparin flush 100 UNIT/ML injection 300 Units  300 Units Intracatheter Q12H Abner Lora MD   300 Units at 05/18/19 0919    perflutren lipid microspheres (DEFINITY) injection 1.65 mg  1.5 mL Intravenous ONCE PRN Luisa Chaudhry MD        acetaminophen (TYLENOL) tablet 650 mg  650 mg Oral Q4H PRN Franne Low, DO   650 mg at 05/15/19 2258    amLODIPine (NORVASC) tablet 5 mg  5 mg Oral Nightly Franne Low, DO   5 mg at 05/17/19 2030    aspirin EC tablet 81 mg  81 mg Oral Daily Franne Low, DO   81 mg at 05/18/19 5609    vitamin D tablet 2,000 Units  2,000 Units Oral Daily Franne Low, DO   2,000 Units at 05/18/19 0252    hydrochlorothiazide (HYDRODIURIL) tablet 12.5 mg  12.5 mg Oral Daily Franne Low, DO   12.5 mg at 05/18/19 0966    isosorbide mononitrate (IMDUR) extended release tablet 30 mg  30 mg Oral Daily Franne Low, DO   30 mg at 05/18/19 0918    magnesium gluconate (MAGONATE) tablet 250 mg  250 mg Oral Daily Charmayne Rutherford, DO   250 mg at 05/18/19 0686    nadolol (CORGARD) tablet 20 mg  20 mg Oral Nightly Charmayne Rutherford, DO   20 mg at 05/17/19 2031    potassium chloride (KLOR-CON M) extended release tablet 10 mEq  10 mEq Oral Daily Charmayne Rutherford, DO   10 mEq at 05/18/19 1139    ranolazine (RANEXA) extended release tablet 500 mg  500 mg Oral BID Charmayne Rutherford, DO   500 mg at 05/18/19 3668    rosuvastatin (CRESTOR) tablet 5 mg  5 mg Oral Once per day on Mon Wed Fri Charmayne Rutherford, DO   5 mg at 05/17/19 1029    thyroid (ARMOUR) tablet 60 mg  60 mg Oral Daily Charmayne Rutherford, DO   60 mg at 05/18/19 0547    sodium chloride flush 0.9 % injection 10 mL  10 mL Intravenous 2 times per day Charmayne Rutherford, DO   10 mL at 05/18/19 0919    sodium chloride flush 0.9 % injection 10 mL  10 mL Intravenous PRN Charmayne Rutherford, DO   10 mL at 05/18/19 0549    magnesium hydroxide (MILK OF MAGNESIA) 400 MG/5ML suspension 30 mL  30 mL Oral Daily PRN Charmayne Rutherford, DO        ondansetron [facility]) injection 4 mg  4 mg Intravenous Q6H PRN Charmayne Rutherford, DO        enoxaparin (LOVENOX) injection 40 mg  40 mg Subcutaneous Daily Charmayne Rutherford, DO   40 mg at 05/18/19 0920    ipratropium-albuterol (DUONEB) nebulizer solution 1 ampule  1 ampule Inhalation Q4H WA Charmayne Rutherford, DO   1 ampule at 05/18/19 0851    piperacillin-tazobactam (ZOSYN) 3.375 g in dextrose 5 % 100 mL IVPB (mini-bag)  3.375 g Intravenous Q8H Luisa Pitts MD 25 mL/hr at 05/18/19 0933 3.375 g at 05/18/19 0933    sodium chloride (Inhalant) 3 % nebulizer solution 4 mL  4 mL Nebulization BID Luisa Pitts MD   4 mL at 05/18/19 0851    budesonide (PULMICORT) nebulizer suspension 250 mcg  0.25 mg Nebulization BID Gisselle Santiago MD   250 mcg at 05/18/19 0851           Physical Exam:  BP (!) 140/77   Pulse 57   Temp 97.8 °F (36.6 °C) (Temporal)   Resp 18   Ht 5' (1.524 m)   Wt 121 lb 6.4 oz (55.1 kg)   SpO2 94%   BMI 23.71 kg/m²   Weight change: Wt Readings from Last 3 Encounters:   05/17/19 121 lb 6.4 oz (55.1 kg)   05/07/19 120 lb (54.4 kg)   04/24/19 125 lb (56.7 kg)     The patient is awake, alert and in no discomfort or distress. No gross musculoskeletal deformity or lymphadenopathy are present. No significant skin or nail changes are present. Gross examination of head, eyes, nose and throat are negative. Jugular venous pressure is normal and no carotid bruits are present. No thyromegaly is noted. Normal respiratory effort is noted with no accessory muscle usage present. Lung fields demonstrate fibrotic rales in both lung bases to ascultation. Cardiac examination is notable for a regular rate and rhythm with no palpable thrill. No gallop rhythm or cardiac murmur are identified. A benign abdominal examination is present with no masses or organomegaly. A normal abdominal aortic pulsation is present. Intact pulses are present throughout all extremities and no peripheral edema is present. No focal neurologic deficits are present. Intake/Output:    Intake/Output Summary (Last 24 hours) at 5/18/2019 1246  Last data filed at 5/18/2019 0845  Gross per 24 hour   Intake 820.9 ml   Output 1200 ml   Net -379.1 ml     I/O this shift:  In: 240 [P.O.:240]  Out: -     Laboratory Tests:  Lab Results   Component Value Date    CREATININE 0.7 05/18/2019    BUN 18 05/18/2019     05/18/2019    K 3.6 05/18/2019    CL 99 05/18/2019    CO2 30 (H) 05/18/2019     No results for input(s): CKTOTAL, CKMB in the last 72 hours.     Invalid input(s): TROPONONI  Lab Results   Component Value Date    BNP 43 03/28/2011     Lab Results   Component Value Date    WBC 8.4 05/15/2019    RBC 3.59 05/15/2019    HGB 9.4 05/15/2019    HCT 34.8 05/18/2019    MCV 81.1 05/15/2019    MCH 26.2 05/15/2019    MCHC 32.3 05/15/2019    RDW 14.2 05/15/2019     05/15/2019    MPV 11.3 05/15/2019     No results for input(s):

## 2019-05-18 NOTE — PLAN OF CARE
Problem: Breathing Pattern - Ineffective:  Goal: Ability to achieve and maintain a regular respiratory rate will improve  Description  Ability to achieve and maintain a regular respiratory rate will improve  5/17/2019 1826 by Jennifer Pimentel RN  Outcome: Met This Shift     Problem: Falls - Risk of:  Goal: Will remain free from falls  Description  Will remain free from falls  5/17/2019 1826 by Jennifer Pimentel RN  Outcome: Met This Shift     Problem: OXYGENATION/RESPIRATORY FUNCTION  Goal: Patient will maintain patent airway  5/17/2019 2233 by Blaise Gooden RN  Outcome: Met This Shift  5/17/2019 1827 by Jennifer Pimentel RN  Outcome: Met This Shift     Problem: OXYGENATION/RESPIRATORY FUNCTION  Goal: Patient will achieve/maintain normal respiratory rate/effort  Description  Respiratory rate and effort will be within normal limits for the patient  5/17/2019 2233 by Blaise Gooden RN  Outcome: Met This Shift  5/17/2019 1827 by Jennifer Pimentel RN  Outcome: Met This Shift

## 2019-05-18 NOTE — PROGRESS NOTES
Answering service notified that consult for DR Vianney Manzanares is cancelled due to pt known to Dr Donnell Osgood    Pt's care team updated

## 2019-05-18 NOTE — PROGRESS NOTES
Inpatient Hematology/Oncology Progress Note    Subjective:  SOB stable. No fever. Objective:  BP (!) 110/57   Pulse 66   Temp 97.8 °F (36.6 °C) (Temporal)   Resp 18   Ht 5' (1.524 m)   Wt 121 lb 6.4 oz (55.1 kg)   SpO2 91%   BMI 23.71 kg/m²   GENERAL: Alert, oriented x 3, not in acute distress. HEENT: PERRLA; EOMI. Oropharynx clear. NECK: Supple. No palpable cervical or supraclavicular lymphadenopathy. LUNGS: Decreased air entry bilaterally. CARDIOVASCULAR: Regular rate. No murmurs, rubs or gallops. ABDOMEN: Soft. Non-tender, non-distended. Positive bowel sounds. EXTREMITIES: Without clubbing, cyanosis, or edema. NEUROLOGIC: No focal deficits. Impression/Plan:  41-year-old lady with bronchiectasis, recurrent sinopulmonary infections, who was diagnosed with common variable immunodeficiency in 2016, her insurance denied IVIG treatment. Bronchoscopy 3/13/2019 revealing purulent secretions, the patient was being treated as OP with Cefepime, for MSSA and pseudomonas bronchiectasis, she was getting weak and had a poor appetite, she was admitted to [facility] (1-RH), she is on Zosyn, Pulmonary and ID are following. IgA 170  70 - 400 mg/dL     IgG 741  700 - 1600 mg/dL     IgM 67  40 - 230 mg/dL     IgG subtypes pending  If Immunoglobulins are low IVIG every 4 weeks would be recommended. Continue Abx per ID.   Normocytic anemia, secondary to chronic inflammation, infection suppressing the bone marrow and antibiotics    05/18/2019  June Vega MD

## 2019-05-18 NOTE — PROGRESS NOTES
CC- weakness  Subjective:  On 2-3 L oxygen  Dry cough  SOB+  The patient is awake and alert. Tolerating medications. Reports no side effects. Afebrile. 10 ROS otherwise negative unless otherwise specified above. Objective:    Vitals:    05/17/19 2330   BP: (!) 110/57   Pulse: 66   Resp: 18   Temp: 97.8 °F (36.6 °C)   SpO2: 91%       General Appearance:    Awake, alert , no acute distress. HEENT:    Normocephalic,PERRL,neck supple, no JVD, mucosa moist, no thrush   Lungs:     Clear to auscultation bilaterally, no wheeze , crackles   Heart:    Regular rate and rhythm, no murmur   Abdomen:     Soft, non-tender, not distended  bowel sounds present,   Extremities:   No edema,no open wound,no erythema, non  tender   Skin:   no rashes or lesions   CBC+dif:  Reviewed and trend followed    Radiology:  Noted    Microbiology:  Pending    Assessment:  · Acute bronchiectasis with MSSA and pseudomonas in BAL  · Intolerance to cefepime  · Anemia - beta lactam induced BM suppression?     Plan:    ·  iv zosyn 4.5 g q6h for 2 more weeks, in hospital getting extended infusions  · Has a PICC line already  · Scripts in chart  · F/u in 2 weeks with me  · Check BMP  · F/U with Dr Devang Perales after D/C      Electronically signed by Alonzo Tyson MD on 5/18/2019 at 7:02 AM

## 2019-05-19 VITALS
OXYGEN SATURATION: 93 % | RESPIRATION RATE: 20 BRPM | HEIGHT: 60 IN | SYSTOLIC BLOOD PRESSURE: 100 MMHG | WEIGHT: 121.4 LBS | TEMPERATURE: 98 F | DIASTOLIC BLOOD PRESSURE: 73 MMHG | HEART RATE: 57 BPM | BODY MASS INDEX: 23.84 KG/M2

## 2019-05-19 PROCEDURE — 6370000000 HC RX 637 (ALT 250 FOR IP): Performed by: INTERNAL MEDICINE

## 2019-05-19 PROCEDURE — 6360000002 HC RX W HCPCS: Performed by: INTERNAL MEDICINE

## 2019-05-19 PROCEDURE — 2580000003 HC RX 258: Performed by: INTERNAL MEDICINE

## 2019-05-19 PROCEDURE — 94640 AIRWAY INHALATION TREATMENT: CPT

## 2019-05-19 PROCEDURE — 2700000000 HC OXYGEN THERAPY PER DAY

## 2019-05-19 PROCEDURE — 99232 SBSQ HOSP IP/OBS MODERATE 35: CPT | Performed by: INTERNAL MEDICINE

## 2019-05-19 RX ORDER — BUDESONIDE 0.25 MG/2ML
250 INHALANT ORAL 2 TIMES DAILY
Qty: 60 AMPULE | Refills: 3 | Status: SHIPPED | OUTPATIENT
Start: 2019-05-19 | End: 2019-05-19 | Stop reason: HOSPADM

## 2019-05-19 RX ORDER — IPRATROPIUM BROMIDE AND ALBUTEROL SULFATE 2.5; .5 MG/3ML; MG/3ML
3 SOLUTION RESPIRATORY (INHALATION)
Qty: 360 ML | Refills: 2 | Status: SHIPPED | OUTPATIENT
Start: 2019-05-19 | End: 2019-05-19 | Stop reason: HOSPADM

## 2019-05-19 RX ORDER — ALBUTEROL SULFATE 90 UG/1
2 AEROSOL, METERED RESPIRATORY (INHALATION) EVERY 6 HOURS PRN
Qty: 1 INHALER | Refills: 3 | Status: SHIPPED | OUTPATIENT
Start: 2019-05-19 | End: 2020-01-01 | Stop reason: SDUPTHER

## 2019-05-19 RX ORDER — MIRTAZAPINE 7.5 MG/1
7.5 TABLET, FILM COATED ORAL NIGHTLY
Qty: 30 TABLET | Refills: 3 | Status: SHIPPED | OUTPATIENT
Start: 2019-05-19 | End: 2019-01-01

## 2019-05-19 RX ADMIN — BUDESONIDE 250 MCG: 0.25 SUSPENSION RESPIRATORY (INHALATION) at 08:44

## 2019-05-19 RX ADMIN — PIPERACILLIN AND TAZOBACTAM 3.38 G: 3; .375 INJECTION, POWDER, FOR SOLUTION INTRAVENOUS at 02:31

## 2019-05-19 RX ADMIN — POTASSIUM CHLORIDE 10 MEQ: 10 TABLET, EXTENDED RELEASE ORAL at 10:13

## 2019-05-19 RX ADMIN — Medication 250 MG: at 10:14

## 2019-05-19 RX ADMIN — ISOSORBIDE MONONITRATE 30 MG: 30 TABLET ORAL at 10:14

## 2019-05-19 RX ADMIN — IPRATROPIUM BROMIDE AND ALBUTEROL SULFATE 1 AMPULE: .5; 3 SOLUTION RESPIRATORY (INHALATION) at 08:43

## 2019-05-19 RX ADMIN — RANOLAZINE 500 MG: 500 TABLET, FILM COATED, EXTENDED RELEASE ORAL at 10:14

## 2019-05-19 RX ADMIN — IPRATROPIUM BROMIDE AND ALBUTEROL SULFATE 1 AMPULE: .5; 3 SOLUTION RESPIRATORY (INHALATION) at 13:24

## 2019-05-19 RX ADMIN — SODIUM CHLORIDE SOLN NEBU 3% 4 ML: 3 NEBU SOLN at 08:44

## 2019-05-19 RX ADMIN — HEPARIN 300 UNITS: 100 SYRINGE at 06:03

## 2019-05-19 RX ADMIN — PIPERACILLIN AND TAZOBACTAM 3.38 G: 3; .375 INJECTION, POWDER, FOR SOLUTION INTRAVENOUS at 10:15

## 2019-05-19 RX ADMIN — HYDROCHLOROTHIAZIDE 12.5 MG: 12.5 TABLET ORAL at 10:14

## 2019-05-19 RX ADMIN — Medication 2000 UNITS: at 10:14

## 2019-05-19 RX ADMIN — ASPIRIN 81 MG: 81 TABLET ORAL at 10:14

## 2019-05-19 RX ADMIN — LEVOTHYROXINE, LIOTHYRONINE 60 MG: 19; 4.5 TABLET ORAL at 06:07

## 2019-05-19 RX ADMIN — Medication 10 ML: at 02:31

## 2019-05-19 ASSESSMENT — PAIN SCALES - GENERAL: PAINLEVEL_OUTOF10: 0

## 2019-05-19 NOTE — PROGRESS NOTES
[employer] was notified via phone of patients discharge. A message was left for adonis ( on call RN), waiting for a call back .

## 2019-05-19 NOTE — PROGRESS NOTES
Subjective: patient seen for follow up doing ok tired otherwise no changes      The patient is awake and alert. No problems overnight. Denies chest pain, angina, and dyspnea. Denies abdominal pain. Tolerating diet. No nausea or vomiting. ROS:  Review of systems not obtained due to patient factors. Objective:      mirtazapine (REMERON) tablet 7.5 mg Nightly   piperacillin-tazobactam (ZOSYN) 3.375 g in dextrose 5 % 100 mL IVPB extended infusion (mini-bag) Q8H   heparin flush 100 UNIT/ML injection 300 Units PRN   heparin flush 100 UNIT/ML injection 300 Units Q12H   perflutren lipid microspheres (DEFINITY) injection 1.65 mg ONCE PRN   acetaminophen (TYLENOL) tablet 650 mg Q4H PRN   amLODIPine (NORVASC) tablet 5 mg Nightly   aspirin EC tablet 81 mg Daily   vitamin D tablet 2,000 Units Daily   hydrochlorothiazide (HYDRODIURIL) tablet 12.5 mg Daily   isosorbide mononitrate (IMDUR) extended release tablet 30 mg Daily   magnesium gluconate (MAGONATE) tablet 250 mg Daily   nadolol (CORGARD) tablet 20 mg Nightly   potassium chloride (KLOR-CON M) extended release tablet 10 mEq Daily   ranolazine (RANEXA) extended release tablet 500 mg BID   rosuvastatin (CRESTOR) tablet 5 mg Once per day on Mon Wed Fri   thyroid (ARMOUR) tablet 60 mg Daily   sodium chloride flush 0.9 % injection 10 mL 2 times per day   sodium chloride flush 0.9 % injection 10 mL PRN   magnesium hydroxide (MILK OF MAGNESIA) 400 MG/5ML suspension 30 mL Daily PRN   ondansetron (ZOFRAN) injection 4 mg Q6H PRN   enoxaparin (LOVENOX) injection 40 mg Daily   ipratropium-albuterol (DUONEB) nebulizer solution 1 ampule Q4H WA   sodium chloride (Inhalant) 3 % nebulizer solution 4 mL BID   budesonide (PULMICORT) nebulizer suspension 250 mcg BID         /73   Pulse 57   Temp 98 °F (36.7 °C) (Temporal)   Resp 20   Ht 5' (1.524 m)   Wt 121 lb 6.4 oz (55.1 kg)   SpO2 93%   BMI 23.71 kg/m²     Lungs:  Normal expansion. Clear to auscultation.   No rales, rhonchi, or wheezing. Heart:  Heart regular rate and rhythm  Abdomen:  Soft, non-tender, normal bowel sounds. No bruits, organomegaly or masses. Extremities: Extremities warm to touch, pink, with no edema.     CBC with Differential:    Lab Results   Component Value Date    WBC 8.4 05/15/2019    RBC 3.59 05/15/2019    HGB 9.4 05/15/2019    HCT 34.8 05/18/2019     05/15/2019    MCV 81.1 05/15/2019    MCH 26.2 05/15/2019    MCHC 32.3 05/15/2019    RDW 14.2 05/15/2019    LYMPHOPCT 26.3 05/15/2019    MONOPCT 9.2 05/15/2019    MYELOPCT 1 08/08/2016    BASOPCT 1.1 05/15/2019    MONOSABS 0.77 05/15/2019    LYMPHSABS 2.20 05/15/2019    EOSABS 0.78 05/15/2019    BASOSABS 0.09 05/15/2019     CMP:    Lab Results   Component Value Date     05/18/2019    K 3.6 05/18/2019    CL 99 05/18/2019    CO2 30 05/18/2019    BUN 18 05/18/2019    CREATININE 0.7 05/18/2019    GFRAA >60 05/18/2019    LABGLOM >60 05/18/2019    GLUCOSE 100 05/18/2019    GLUCOSE 120 03/28/2011    PROT 6.5 05/13/2019    LABALBU 3.9 05/13/2019    LABALBU 4.1 03/28/2011    CALCIUM 9.5 05/18/2019    BILITOT 0.3 05/13/2019    ALKPHOS 60 05/13/2019    AST 18 05/13/2019    ALT 13 05/13/2019        Assessment:    Patient Active Problem List   Diagnosis    GERD (gastroesophageal reflux disease)    CAD (coronary artery disease)    HTN (hypertension)    DJD (degenerative joint disease) of knee    Allergy to sulfa drugs    Allergy to tetracycline    Presence of drug coated stent in LAD coronary artery    Dyspnea    Other diseases of lung, not elsewhere classified    Bronchiectasis with acute exacerbation ([address]    Bronchiectasis ([address]    Moderate protein-calorie malnutrition ([address]    Humoral immunodeficiency ([address]    Nonfamilial hypogammaglobulinemia ([address]     Acute on chronic respiratory failure with hypoxia [address] [address] [facility]    Pure hypercholesterolemia       Plan:  Dc home         Abner Lora  11:20 AM  5/19/2019

## 2019-05-19 NOTE — PROGRESS NOTES
Pt's discharge Respiratory medications verified with Dr Lyndsey Martinez per Dr Yovana Maguire request

## 2019-05-19 NOTE — PROGRESS NOTES
05/15/2019    LYMPHOPCT 26.3 05/15/2019    RBC 3.59 05/15/2019    MCH 26.2 05/15/2019    MCHC 32.3 05/15/2019    RDW 14.2 05/15/2019    NEUTOPHILPCT 53.1 05/15/2019    MONOPCT 9.2 05/15/2019    BASOPCT 1.1 05/15/2019    NEUTROABS 4.43 05/15/2019    LYMPHSABS 2.20 05/15/2019    MONOSABS 0.77 05/15/2019    EOSABS 0.78 (H) 05/15/2019    BASOSABS 0.09 05/15/2019       Recent Labs     05/18/19  0600 05/15/19  0416 05/13/19  1350 05/09/19  1200   WBC  --  8.4 9.8 9.5   HGB  --  9.4* 10.3* 11.0*   HCT 34.8 29.1* 32.7* 36.2   MCV  --  81.1 82.4 84.8   PLT  --  124* 158 196       BMP:   Recent Labs     05/18/19  0630      K 3.6   CL 99   CO2 30*   BUN 18   CREATININE 0.7       MG:   Lab Results   Component Value Date    MG 1.9 03/28/2011     Ca/Phos:   Lab Results   Component Value Date    CALCIUM 9.5 05/18/2019     Amylase:   Lab Results   Component Value Date    AMYLASE 56 03/28/2011     Lipase:   Lab Results   Component Value Date    LIPASE 24 03/28/2011     LIVER PROFILE:   No results for input(s): AST, ALT, LIPASE, BILIDIR, BILITOT, ALKPHOS in the last 72 hours. Invalid input(s): AMYLASE,  ALB    PT/INR: No results for input(s): PROTIME, INR in the last 72 hours. APTT: No results for input(s): APTT in the last 72 hours.     Cardiac Enzymes:  Lab Results   Component Value Date    CKTOTAL 32 (L) 03/28/2011    CKMB <0.2 03/28/2011    TROPONINI <0.01 03/28/2011                   PROBLEM LIST:  Patient Active Problem List   Diagnosis    GERD (gastroesophageal reflux disease)    CAD (coronary artery disease)    HTN (hypertension)    DJD (degenerative joint disease) of knee    Allergy to sulfa drugs    Allergy to tetracycline    Presence of drug coated stent in LAD coronary artery    Dyspnea    Other diseases of lung, not elsewhere classified    [facility]ctasis with acute exacerbation [facility]    Bronchiectasis ([facility]    Moderate protein-calorie malnutrition ([facility]    Humoral immunodeficiency ([facility]    Nonfamilial hypogammaglobulinemia (HCC)     Acute on chronic respiratory failure with hypoxia (HCC)    Pure hypercholesterolemia               ASSESSMENT:  1-Bronchiectasis with staph and pseudomonas and candida ,caindida could be contamination  2-Severe allergic rhinitis with sinusitis  3- Hypogammaglobulinemia  4- Pseudomonas infection/colonization  5-hoarseness unknown etiology, was attributed to tobramycin inhaler use          6- ILD ?  etiology         PLAN:  *-Continue  zosyn Q6 ,has PICC line ,may need to increase    do to every  6 h,defer to ID   *-Chest vest  *- Pending 2 d eco  *-Elevated BNP and will give dose of lasix   *-Mucomyst nebulizer  *-incentive  of spirometry  *-Flutter valve  *-ICS   *- BD  Weight loss work up as per primary   2 d echo pending   BNP>1000  D Dimer negative   Appreciate Hematology input   Will watch over next 1-2 days  Arrange for OP Zosyn   BD   Can go from Pulm stand point in am   [address] [facility]

## 2019-05-20 ENCOUNTER — TELEPHONE (OUTPATIENT)
Dept: CARDIOLOGY CLINIC | Age: 79
End: 2019-05-20

## 2019-05-20 ENCOUNTER — HOSPITAL ENCOUNTER (OUTPATIENT)
Age: 79
Discharge: HOME | End: 2019-05-22
Payer: MEDICARE

## 2019-05-20 LAB
ALBUMIN SERPL-MCNC: 3.2 G/DL (ref 3.5–4.7)
ALBUMIN SERPL-MCNC: 4.2 G/DL (ref 3.5–5.2)
ALP BLD-CCNC: 61 U/L (ref 35–104)
ALPHA-1-GLOBULIN: 0.4 G/DL (ref 0.2–0.4)
ALPHA-2-GLOBULIN: 1.1 G/FL (ref 0.5–1)
ALT SERPL-CCNC: 36 U/L (ref 0–32)
ANION GAP SERPL CALCULATED.3IONS-SCNC: 16 MMOL/L (ref 7–16)
AST SERPL-CCNC: 38 U/L (ref 0–31)
BASOPHILS ABSOLUTE: 0.15 E9/L (ref 0–0.2)
BASOPHILS RELATIVE PERCENT: 1.2 % (ref 0–2)
BETA GLOBULIN: 1.1 G/DL (ref 0.8–1.3)
BILIRUB SERPL-MCNC: 0.4 MG/DL (ref 0–1.2)
BUN BLDV-MCNC: 22 MG/DL (ref 8–23)
C-REACTIVE PROTEIN: 2.3 MG/DL (ref 0–0.4)
CALCIUM SERPL-MCNC: 9.5 MG/DL (ref 8.6–10.2)
CHLORIDE BLD-SCNC: 101 MMOL/L (ref 98–107)
CO2: 25 MMOL/L (ref 22–29)
CREAT SERPL-MCNC: 1 MG/DL (ref 0.5–1)
ELECTROPHORESIS: ABNORMAL
EOSINOPHILS ABSOLUTE: 1.6 E9/L (ref 0.05–0.5)
EOSINOPHILS RELATIVE PERCENT: 13.1 % (ref 0–6)
GAMMA GLOBULIN: 1 G/DL (ref 0.7–1.6)
GFR AFRICAN AMERICAN: >60
GFR NON-AFRICAN AMERICAN: 53 ML/MIN/1.73
GLUCOSE BLD-MCNC: 126 MG/DL (ref 74–99)
HCT VFR BLD CALC: 35.6 % (ref 34–48)
HEMOGLOBIN: 11.1 G/DL (ref 11.5–15.5)
IGG 1: 312 MG/DL (ref 240–1118)
IGG 2: 368 MG/DL (ref 124–549)
IGG 3: 48 MG/DL (ref 21–134)
IGG 4: 15 MG/DL (ref 1–123)
IMMATURE GRANULOCYTES #: 0.07 E9/L
IMMATURE GRANULOCYTES %: 0.6 % (ref 0–5)
LYMPHOCYTES ABSOLUTE: 1.29 E9/L (ref 1.5–4)
LYMPHOCYTES RELATIVE PERCENT: 10.6 % (ref 20–42)
MCH RBC QN AUTO: 26.2 PG (ref 26–35)
MCHC RBC AUTO-ENTMCNC: 31.2 % (ref 32–34.5)
MCV RBC AUTO: 84.2 FL (ref 80–99.9)
MONOCYTES ABSOLUTE: 0.84 E9/L (ref 0.1–0.95)
MONOCYTES RELATIVE PERCENT: 6.9 % (ref 2–12)
NEUTROPHILS ABSOLUTE: 8.22 E9/L (ref 1.8–7.3)
NEUTROPHILS RELATIVE PERCENT: 67.6 % (ref 43–80)
PATHOLOGIST REVIEW: NORMAL
PDW BLD-RTO: 14.4 FL (ref 11.5–15)
PLATELET # BLD: 158 E9/L (ref 130–450)
PMV BLD AUTO: 12 FL (ref 7–12)
POTASSIUM SERPL-SCNC: 3.6 MMOL/L (ref 3.5–5)
RBC # BLD: 4.23 E12/L (ref 3.5–5.5)
SEDIMENTATION RATE, ERYTHROCYTE: 65 MM/HR (ref 0–20)
SODIUM BLD-SCNC: 142 MMOL/L (ref 132–146)
TOTAL PROTEIN: 6.7 G/DL (ref 6.4–8.3)
TOTAL PROTEIN: 7.4 G/DL (ref 6.4–8.3)
WBC # BLD: 12.2 E9/L (ref 4.5–11.5)

## 2019-05-20 PROCEDURE — 85651 RBC SED RATE NONAUTOMATED: CPT

## 2019-05-20 PROCEDURE — 80053 COMPREHEN METABOLIC PANEL: CPT

## 2019-05-20 PROCEDURE — 85025 COMPLETE CBC W/AUTO DIFF WBC: CPT

## 2019-05-20 PROCEDURE — 86140 C-REACTIVE PROTEIN: CPT

## 2019-05-20 NOTE — PROGRESS NOTES
Pulmonary [address] [facility]                             Pulmonary Consult/Progress Note :          Patient: Eamon Jason  MRN: [id number]  : 1940      Date of Admission: .2019 11:54 AM    Consulting Physician:Dr Lora         Reason for Consultation:Pneumonia /severe bronchectasis   CC : cough ,SOB   HPI:   Doing a lot better  No fever or chills   No chest pain   Less SOB   Already on o2     PHYSICAL EXAMINATION:     VITAL SIGNS:  Vitals:    19 1000   BP: 100/73   Pulse: 57   Resp: 20   Temp: 98 °F (36.7 °C)   SpO2: 93%             INTAKE/OUTPUTS:  I/O last 3 completed shifts: In: 7256 [P.O.:1040;  I.V.:16; IV Piggyback:200]  Out: 650 [Urine:650]    Intake/Output Summary (Last 24 hours) at 20197  Last data filed at 2019 1325  Gross per 24 hour   Intake 1096 ml   Output 650 ml   Net 446 ml       General Appearance: alert and oriented to person, place and time, well-developed and   well-nourished, in no acute distress   Eyes: pupils equal, round, and reactive to light, extraocular eye movements intact, conjunctivae normal and sclera anicteric   Neck: neck supple and non tender without mass, no thyromegaly, no thyroid nodules and no cervical adenopathy   Pulmonary/Chest:rhonchi    Cardiovascular: normal rate, regular rhythm, normal S1 and S2, no murmurs, rubs, clicks or gallops, distal pulses intact, no carotid bruits, no murmurs, no gallops, no carotid bruits and no JVD   Abdomen: obese, soft, non-tender, non-distended, normal bowel sounds, no masses or organomegaly   Extremities:No edema or cyanosis   Musculoskeletal: normal range of motion, no joint swelling, deformity or tenderness   Neurologic: reflexes normal and symmetric, no cranial nerve deficit noted    LABS/IMAGING:    CBC:  Lab Results   Component Value Date    WBC 8.4 05/15/2019    HGB 9.4 (L) 05/15/2019    HCT 34.8 2019    MCV 81.1 05/15/2019     (L) 05/15/2019 LYMPHOPCT 26.3 05/15/2019    RBC 3.59 05/15/2019    MCH 26.2 05/15/2019    MCHC 32.3 05/15/2019    RDW 14.2 05/15/2019    NEUTOPHILPCT 53.1 05/15/2019    MONOPCT 9.2 05/15/2019    BASOPCT 1.1 05/15/2019    NEUTROABS 4.43 05/15/2019    LYMPHSABS 2.20 05/15/2019    MONOSABS 0.77 05/15/2019    EOSABS 0.78 (H) 05/15/2019    BASOSABS 0.09 05/15/2019       Recent Labs     05/18/19  0600 05/15/19  0416 05/13/19  1350 05/09/19  1200   WBC  --  8.4 9.8 9.5   HGB  --  9.4* 10.3* 11.0*   HCT 34.8 29.1* 32.7* 36.2   MCV  --  81.1 82.4 84.8   PLT  --  124* 158 196       BMP:   Recent Labs     05/18/19  0630      K 3.6   CL 99   CO2 30*   BUN 18   CREATININE 0.7       MG:   Lab Results   Component Value Date    MG 1.9 03/28/2011     Ca/Phos:   Lab Results   Component Value Date    CALCIUM 9.5 05/18/2019     Amylase:   Lab Results   Component Value Date    AMYLASE 56 03/28/2011     Lipase:   Lab Results   Component Value Date    LIPASE 24 03/28/2011     LIVER PROFILE:   No results for input(s): AST, ALT, LIPASE, BILIDIR, BILITOT, ALKPHOS in the last 72 hours. Invalid input(s): AMYLASE,  ALB    PT/INR: No results for input(s): PROTIME, INR in the last 72 hours. APTT: No results for input(s): APTT in the last 72 hours.     Cardiac Enzymes:  Lab Results   Component Value Date    CKTOTAL 32 (L) 03/28/2011    CKMB <0.2 03/28/2011    TROPONINI <0.01 03/28/2011                   PROBLEM LIST:  Patient Active Problem List   Diagnosis    GERD (gastroesophageal reflux disease)    CAD (coronary artery disease)    HTN (hypertension)    DJD (degenerative joint disease) of knee    Allergy to sulfa drugs    Allergy to tetracycline    Presence of drug coated stent in LAD coronary artery    Dyspnea    Other diseases of lung, not elsewhere classified    Bronchiectasis with acute exacerbation (HCC)    Bronchiectasis ([address]    Moderate protein-calorie malnutrition ([address]    Humoral immunodeficiency ([address]    Nonfamilial

## 2019-05-21 ENCOUNTER — APPOINTMENT (OUTPATIENT)
Dept: GENERAL RADIOLOGY | Age: 79
End: 2019-05-21
Payer: MEDICARE

## 2019-05-21 ENCOUNTER — APPOINTMENT (OUTPATIENT)
Dept: CT IMAGING | Age: 79
End: 2019-05-21
Payer: MEDICARE

## 2019-05-21 ENCOUNTER — HOSPITAL ENCOUNTER (EMERGENCY)
Age: 79
Discharge: HOME | End: 2019-05-21
Attending: EMERGENCY MEDICINE
Payer: MEDICARE

## 2019-05-21 ENCOUNTER — TELEPHONE (OUTPATIENT)
Dept: CARDIOLOGY CLINIC | Age: 79
End: 2019-05-21

## 2019-05-21 VITALS
HEART RATE: 64 BPM | WEIGHT: 120 LBS | TEMPERATURE: 98 F | DIASTOLIC BLOOD PRESSURE: 59 MMHG | HEIGHT: 60 IN | RESPIRATION RATE: 22 BRPM | OXYGEN SATURATION: 97 % | SYSTOLIC BLOOD PRESSURE: 117 MMHG | BODY MASS INDEX: 23.56 KG/M2

## 2019-05-21 DIAGNOSIS — R06.00 DYSPNEA AND RESPIRATORY ABNORMALITIES: Primary | ICD-10-CM

## 2019-05-21 DIAGNOSIS — R06.89 DYSPNEA AND RESPIRATORY ABNORMALITIES: Primary | ICD-10-CM

## 2019-05-21 DIAGNOSIS — J47.9 BRONCHIECTASIS WITHOUT COMPLICATION (HCC): ICD-10-CM

## 2019-05-21 LAB
ANION GAP SERPL CALCULATED.3IONS-SCNC: 9 MMOL/L (ref 7–16)
BASOPHILS ABSOLUTE: 0.13 E9/L (ref 0–0.2)
BASOPHILS RELATIVE PERCENT: 1.4 % (ref 0–2)
BUN BLDV-MCNC: 19 MG/DL (ref 8–23)
CALCIUM SERPL-MCNC: 9.1 MG/DL (ref 8.6–10.2)
CHLORIDE BLD-SCNC: 100 MMOL/L (ref 98–107)
CO2: 28 MMOL/L (ref 22–29)
CREAT SERPL-MCNC: 0.7 MG/DL (ref 0.5–1)
EOSINOPHILS ABSOLUTE: 0.76 E9/L (ref 0.05–0.5)
EOSINOPHILS RELATIVE PERCENT: 7.9 % (ref 0–6)
GFR AFRICAN AMERICAN: >60
GFR NON-AFRICAN AMERICAN: >60 ML/MIN/1.73
GLUCOSE BLD-MCNC: 129 MG/DL (ref 74–99)
HCT VFR BLD CALC: 33.5 % (ref 34–48)
HEMOGLOBIN: 10.5 G/DL (ref 11.5–15.5)
IMMATURE GRANULOCYTES #: 0.07 E9/L
IMMATURE GRANULOCYTES %: 0.7 % (ref 0–5)
LYMPHOCYTES ABSOLUTE: 1.52 E9/L (ref 1.5–4)
LYMPHOCYTES RELATIVE PERCENT: 15.9 % (ref 20–42)
MCH RBC QN AUTO: 26.4 PG (ref 26–35)
MCHC RBC AUTO-ENTMCNC: 31.3 % (ref 32–34.5)
MCV RBC AUTO: 84.4 FL (ref 80–99.9)
MONOCYTES ABSOLUTE: 0.73 E9/L (ref 0.1–0.95)
MONOCYTES RELATIVE PERCENT: 7.6 % (ref 2–12)
NEUTROPHILS ABSOLUTE: 6.37 E9/L (ref 1.8–7.3)
NEUTROPHILS RELATIVE PERCENT: 66.5 % (ref 43–80)
PDW BLD-RTO: 14.4 FL (ref 11.5–15)
PLATELET # BLD: 152 E9/L (ref 130–450)
PMV BLD AUTO: 11.3 FL (ref 7–12)
POTASSIUM SERPL-SCNC: 4.2 MMOL/L (ref 3.5–5)
PRO-BNP: 1251 PG/ML (ref 0–450)
RBC # BLD: 3.97 E12/L (ref 3.5–5.5)
SODIUM BLD-SCNC: 137 MMOL/L (ref 132–146)
TROPONIN: <0.01 NG/ML (ref 0–0.03)
WBC # BLD: 9.6 E9/L (ref 4.5–11.5)

## 2019-05-21 PROCEDURE — 93005 ELECTROCARDIOGRAM TRACING: CPT | Performed by: PHYSICIAN ASSISTANT

## 2019-05-21 PROCEDURE — 84484 ASSAY OF TROPONIN QUANT: CPT

## 2019-05-21 PROCEDURE — 80048 BASIC METABOLIC PNL TOTAL CA: CPT

## 2019-05-21 PROCEDURE — 96374 THER/PROPH/DIAG INJ IV PUSH: CPT

## 2019-05-21 PROCEDURE — 6360000002 HC RX W HCPCS: Performed by: EMERGENCY MEDICINE

## 2019-05-21 PROCEDURE — 99285 EMERGENCY DEPT VISIT HI MDM: CPT

## 2019-05-21 PROCEDURE — 83880 ASSAY OF NATRIURETIC PEPTIDE: CPT

## 2019-05-21 PROCEDURE — 71275 CT ANGIOGRAPHY CHEST: CPT

## 2019-05-21 PROCEDURE — 71046 X-RAY EXAM CHEST 2 VIEWS: CPT

## 2019-05-21 PROCEDURE — 87040 BLOOD CULTURE FOR BACTERIA: CPT

## 2019-05-21 PROCEDURE — 6360000004 HC RX CONTRAST MEDICATION: Performed by: RADIOLOGY

## 2019-05-21 PROCEDURE — 85025 COMPLETE CBC W/AUTO DIFF WBC: CPT

## 2019-05-21 RX ORDER — METHYLPREDNISOLONE SODIUM SUCCINATE 125 MG/2ML
80 INJECTION, POWDER, LYOPHILIZED, FOR SOLUTION INTRAMUSCULAR; INTRAVENOUS ONCE
Status: COMPLETED | OUTPATIENT
Start: 2019-05-21 | End: 2019-05-21

## 2019-05-21 RX ORDER — IPRATROPIUM BROMIDE AND ALBUTEROL SULFATE 2.5; .5 MG/3ML; MG/3ML
1 SOLUTION RESPIRATORY (INHALATION) ONCE
Status: DISCONTINUED | OUTPATIENT
Start: 2019-05-21 | End: 2019-05-21

## 2019-05-21 RX ORDER — SODIUM CHLORIDE 0.9 % (FLUSH) 0.9 %
10 SYRINGE (ML) INJECTION PRN
Status: DISCONTINUED | OUTPATIENT
Start: 2019-05-21 | End: 2019-05-21 | Stop reason: HOSPADM

## 2019-05-21 RX ADMIN — IOPAMIDOL 60 ML: 755 INJECTION, SOLUTION INTRAVENOUS at 20:42

## 2019-05-21 RX ADMIN — METHYLPREDNISOLONE SODIUM SUCCINATE 80 MG: 125 INJECTION, POWDER, FOR SOLUTION INTRAMUSCULAR; INTRAVENOUS at 17:49

## 2019-05-21 ASSESSMENT — ENCOUNTER SYMPTOMS
COUGH: 0
SHORTNESS OF BREATH: 1
ABDOMINAL PAIN: 0
VOMITING: 0
WHEEZING: 0
BACK PAIN: 0
SORE THROAT: 0
BLOOD IN STOOL: 0
SPUTUM PRODUCTION: 0
NAUSEA: 0

## 2019-05-21 ASSESSMENT — PAIN SCALES - GENERAL: PAINLEVEL_OUTOF10: 6

## 2019-05-21 NOTE — ED PROVIDER NOTES
History:  has a past medical history of Arthritis, Bronchiectasis ([address] Bronchiectasis ([address] CAD (coronary artery disease), Cataract, Gastritis, GERD (gastroesophageal reflux disease), Heart block, Hyperlipidemia, Prolonged emergence from general anesthesia, Thyroid disease, and Vitamin D deficiency. 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 Social History:  reports that she quit smoking about 34 years ago. She has a 25.00 pack-year smoking history. She has never used smokeless tobacco. She reports that she does not drink alcohol or use drugs. Family History: family history includes Alzheimer's Disease in her brother; Cancer in her paternal grandfather; Diabetes in her maternal grandmother; Osteoporosis in her mother; Other in her father; Stroke in her mother. The patients home medications have been reviewed. Allergies: Metoprolol;  Tetracyclines & related; and Sulfa antibiotics    -------------------------------------------------- RESULTS -------------------------------------------------  Labs:  Results for orders placed or performed during the hospital encounter of 05/21/19   Culture Blood #1   Result Value Ref Range    Blood Culture, Routine 24 Hours- no growth    Culture Blood #2   Result Value Ref Range    Culture, Blood 2 24 Hours- no growth    CBC Auto Differential   Result Value Ref Range    WBC 9.6 4.5 - 11.5 E9/L    RBC 3.97 3.50 - 5.50 E12/L    Hemoglobin 10.5 (L) 11.5 - 15.5 g/dL    Hematocrit 33.5 (L) 34.0 - 48.0 %    MCV 84.4 80.0 - 99.9 fL    MCH 26.4 26.0 - 35.0 pg    MCHC 31.3 (L) 32.0 - 34.5 %    RDW 14.4 11.5 - 15.0 fL    Platelets 094 904 - 791 E9/L    MPV 11.3 7.0 - 12.0 fL    Neutrophils % 66.5 43.0 - 80.0 %    Immature Granulocytes % 0.7 0.0 - 5.0 %    Lymphocytes % 15.9 (L) 20.0 - 42.0 %    Monocytes % 7.6 2.0 - 12.0 %    Eosinophils % 7.9 (H) 0.0 - 6.0 %    Basophils % 1.4 0.0 - 2.0 %    Neutrophils # 6.37 1.80 - 7.30 E9/L    Immature Granulocytes # 0.07 E9/L    Lymphocytes # 1.52 1.50 - 4.00 E9/L    Monocytes # 0.73 0.10 - 0.95 E9/L    Eosinophils # 0.76 (H) 0.05 - 0.50 E9/L    Basophils # 0.13 0.00 - 0.20 A3/K   Basic Metabolic Panel   Result Value Ref Range    Sodium 137 132 - 146 mmol/L    Potassium 4.2 3.5 - 5.0 mmol/L    Chloride 100 98 - 107 mmol/L    CO2 28 22 - 29 mmol/L    Anion Gap 9 7 - 16 mmol/L    Glucose 129 (H) 74 - 99 mg/dL    BUN 19 8 - 23 mg/dL    CREATININE 0.7 0.5 - 1.0 mg/dL    GFR Non-African American >60 >=60 mL/min/1.73    GFR African American >60     Calcium 9.1 8.6 - 10.2 mg/dL   Troponin   Result Value Ref Range    Troponin <0.01 0.00 - 0.03 ng/mL   Brain Natriuretic Peptide   Result Value Ref Range    Pro-BNP 1,251 (H) 0 - 450 pg/mL   EKG 12 Lead   Result Value Ref Range    Ventricular Rate 60 BPM    Atrial Rate 60 BPM    P-R Interval 196 ms    QRS Duration 84 ms    Q-T Interval 432 ms    QTc Calculation (Bazett) 432 ms    P Axis 39 degrees    R Axis -3 degrees    T Axis 8 degrees       Radiology:  CTA PULMONARY W CONTRAST   Final Result      1. No evidence of pulmonary embolism. 2. Findings suggest worsening chronic interstitial lung disease. 3. Mediastinal and hilar lymphadenopathy which has increased compared   to prior. Clinical correlation recommended. XR CHEST STANDARD (2 VW)   Final Result      1. Findings suggest chronic interstitial lung disease.       2. No obvious airspace opacity or pleural effusion.          ------------------------- NURSING NOTES AND VITALS REVIEWED ---------------------------  Date / Time Roomed:  5/21/2019  5:05 PM  ED Bed Assignment:  SHERIF/SHERIF    The nursing notes within the ED encounter and vital signs as below have been reviewed. BP (!) 117/59   Pulse 64   Temp 98 °F (36.7 °C) (Oral)   Resp 22   Ht 5' (1.524 m)   Wt 120 lb (54.4 kg)   SpO2 97%   BMI 23.44 kg/m²   Oxygen Saturation Interpretation: Normal      ------------------------------------------ PROGRESS NOTES ------------------------------------------  I have spoken with the patient and discussed todays results, in addition to providing specific details for the plan of care and counseling regarding the diagnosis and prognosis. Their questions are answered at this time and they are agreeable with the plan. I discussed at length with them reasons for immediate return here for re evaluation. They will followup with their primary care physician by calling their office tomorrow. --------------------------------- ADDITIONAL PROVIDER NOTES ---------------------------------  At this time the patient is without objective evidence of an acute process requiring hospitalization or inpatient management. They have remained hemodynamically stable throughout their entire ED visit and are stable for discharge with outpatient follow-up. The plan has been discussed in detail and they are aware of the specific conditions for emergent return, as well as the importance of follow-up. Discharge Medication List as of 5/21/2019  8:02 PM          Diagnosis:  1. Dyspnea and respiratory abnormalities    2. Bronchiectasis without complication ([facility]        Disposition:  Patient's disposition: Discharge to home  Patient's condition is stable.               Pedro House,   Resident  05/21/19 2011       Pedro House, DO  Resident  05/23/19 1500

## 2019-05-21 NOTE — ED TRIAGE NOTES
FIRST PROVIDER CONTACT ASSESSMENT NOTE      Department of Emergency Medicine   Admit Date: No admission date for patient encounter. Chief Complaint: Shortness of Breath (released from hospital last week, not any better- states she has a \"lung infection\") and Chest Pain      History of Present Illness:    Elsie Barraza is a 77 y.o. female who presents to the ED for SOB. Arrives hypoxic.  On 3L she was 75%, on 7L at 90%.         -----------------END OF FIRST PROVIDER CONTACT ASSESSMENT NOTE--------------  Electronically signed by KATEY Bianchi   DD: 5/21/19

## 2019-05-22 LAB
EKG ATRIAL RATE: 60 BPM
EKG P AXIS: 39 DEGREES
EKG P-R INTERVAL: 196 MS
EKG Q-T INTERVAL: 432 MS
EKG QRS DURATION: 84 MS
EKG QTC CALCULATION (BAZETT): 432 MS
EKG R AXIS: -3 DEGREES
EKG T AXIS: 8 DEGREES
EKG VENTRICULAR RATE: 60 BPM

## 2019-05-22 PROCEDURE — 93010 ELECTROCARDIOGRAM REPORT: CPT | Performed by: INTERNAL MEDICINE

## 2019-05-23 ENCOUNTER — HOSPITAL ENCOUNTER (OUTPATIENT)
Age: 79
Discharge: HOME | End: 2019-05-25
Payer: MEDICARE

## 2019-05-23 LAB
ALBUMIN SERPL-MCNC: 3.9 G/DL (ref 3.5–5.2)
ALP BLD-CCNC: 51 U/L (ref 35–104)
ALT SERPL-CCNC: 24 U/L (ref 0–32)
ANION GAP SERPL CALCULATED.3IONS-SCNC: 16 MMOL/L (ref 7–16)
AST SERPL-CCNC: 24 U/L (ref 0–31)
BASOPHILS ABSOLUTE: 0.1 E9/L (ref 0–0.2)
BASOPHILS RELATIVE PERCENT: 1 % (ref 0–2)
BILIRUB SERPL-MCNC: 0.3 MG/DL (ref 0–1.2)
BUN BLDV-MCNC: 25 MG/DL (ref 8–23)
CALCIUM SERPL-MCNC: 9.2 MG/DL (ref 8.6–10.2)
CHLORIDE BLD-SCNC: 99 MMOL/L (ref 98–107)
CO2: 25 MMOL/L (ref 22–29)
CREAT SERPL-MCNC: 0.8 MG/DL (ref 0.5–1)
EOSINOPHILS ABSOLUTE: 0.32 E9/L (ref 0.05–0.5)
EOSINOPHILS RELATIVE PERCENT: 3.1 % (ref 0–6)
GFR AFRICAN AMERICAN: >60
GFR NON-AFRICAN AMERICAN: >60 ML/MIN/1.73
GLUCOSE BLD-MCNC: 158 MG/DL (ref 74–99)
HCT VFR BLD CALC: 32.1 % (ref 34–48)
HEMOGLOBIN: 9.8 G/DL (ref 11.5–15.5)
IMMATURE GRANULOCYTES #: 0.06 E9/L
IMMATURE GRANULOCYTES %: 0.6 % (ref 0–5)
LYMPHOCYTES ABSOLUTE: 1.78 E9/L (ref 1.5–4)
LYMPHOCYTES RELATIVE PERCENT: 17.5 % (ref 20–42)
MCH RBC QN AUTO: 25.5 PG (ref 26–35)
MCHC RBC AUTO-ENTMCNC: 30.5 % (ref 32–34.5)
MCV RBC AUTO: 83.4 FL (ref 80–99.9)
MONOCYTES ABSOLUTE: 0.7 E9/L (ref 0.1–0.95)
MONOCYTES RELATIVE PERCENT: 6.9 % (ref 2–12)
NEUTROPHILS ABSOLUTE: 7.2 E9/L (ref 1.8–7.3)
NEUTROPHILS RELATIVE PERCENT: 70.9 % (ref 43–80)
PDW BLD-RTO: 14.2 FL (ref 11.5–15)
PLATELET # BLD: 195 E9/L (ref 130–450)
PMV BLD AUTO: 11.5 FL (ref 7–12)
POTASSIUM SERPL-SCNC: 2.9 MMOL/L (ref 3.5–5)
RBC # BLD: 3.85 E12/L (ref 3.5–5.5)
SODIUM BLD-SCNC: 140 MMOL/L (ref 132–146)
TOTAL PROTEIN: 7 G/DL (ref 6.4–8.3)
WBC # BLD: 10.2 E9/L (ref 4.5–11.5)

## 2019-05-23 PROCEDURE — 85025 COMPLETE CBC W/AUTO DIFF WBC: CPT

## 2019-05-23 PROCEDURE — 80053 COMPREHEN METABOLIC PANEL: CPT

## 2019-05-24 ENCOUNTER — HOSPITAL ENCOUNTER (OUTPATIENT)
Age: 79
Discharge: HOME | End: 2019-05-26
Payer: MEDICARE

## 2019-05-26 LAB
BLOOD CULTURE, ROUTINE: NORMAL
CULTURE, BLOOD 2: NORMAL

## 2019-05-28 ENCOUNTER — TELEPHONE (OUTPATIENT)
Dept: PULMONOLOGY | Age: 79
End: 2019-05-28

## 2019-05-28 ENCOUNTER — HOSPITAL ENCOUNTER (OUTPATIENT)
Age: 79
Discharge: HOME | End: 2019-05-30
Payer: MEDICARE

## 2019-05-28 LAB
BASOPHILS ABSOLUTE: 0.08 E9/L (ref 0–0.2)
BASOPHILS RELATIVE PERCENT: 1.1 % (ref 0–2)
EOSINOPHILS ABSOLUTE: 0.84 E9/L (ref 0.05–0.5)
EOSINOPHILS RELATIVE PERCENT: 11 % (ref 0–6)
HCT VFR BLD CALC: 30.2 % (ref 34–48)
HEMOGLOBIN: 9.2 G/DL (ref 11.5–15.5)
IMMATURE GRANULOCYTES #: 0.06 E9/L
IMMATURE GRANULOCYTES %: 0.8 % (ref 0–5)
LYMPHOCYTES ABSOLUTE: 1.43 E9/L (ref 1.5–4)
LYMPHOCYTES RELATIVE PERCENT: 18.8 % (ref 20–42)
MCH RBC QN AUTO: 25.1 PG (ref 26–35)
MCHC RBC AUTO-ENTMCNC: 30.5 % (ref 32–34.5)
MCV RBC AUTO: 82.3 FL (ref 80–99.9)
MONOCYTES ABSOLUTE: 0.66 E9/L (ref 0.1–0.95)
MONOCYTES RELATIVE PERCENT: 8.7 % (ref 2–12)
NEUTROPHILS ABSOLUTE: 4.54 E9/L (ref 1.8–7.3)
NEUTROPHILS RELATIVE PERCENT: 59.6 % (ref 43–80)
PDW BLD-RTO: 14 FL (ref 11.5–15)
PLATELET # BLD: 201 E9/L (ref 130–450)
PMV BLD AUTO: 11.7 FL (ref 7–12)
RBC # BLD: 3.67 E12/L (ref 3.5–5.5)
WBC # BLD: 7.6 E9/L (ref 4.5–11.5)

## 2019-05-28 PROCEDURE — 80053 COMPREHEN METABOLIC PANEL: CPT

## 2019-05-28 PROCEDURE — 83735 ASSAY OF MAGNESIUM: CPT

## 2019-05-28 PROCEDURE — 85651 RBC SED RATE NONAUTOMATED: CPT

## 2019-05-28 PROCEDURE — 86140 C-REACTIVE PROTEIN: CPT

## 2019-05-28 PROCEDURE — 85025 COMPLETE CBC W/AUTO DIFF WBC: CPT

## 2019-05-28 NOTE — TELEPHONE ENCOUNTER
Pt calling requesting follow up appt with Dr. Romina Ellis to discuss care/treatment. Pt has appt with Dr. Stefany Perry tomorrow and she is feeling miserable with the oxygen levels being low(can't do anything without oxygen on now) having diarrhea and just all around malaise. Advised Dr. Romina Ellis and he stated he would like to see her in office to discuss care/treatment. Advised he will discuss with Cardiology and infectious team members and will see her on Friday.  Advised pt to come to office at 1130am and we will see her as last pt of the am.

## 2019-05-29 ENCOUNTER — TELEPHONE (OUTPATIENT)
Dept: ONCOLOGY | Age: 79
End: 2019-05-29

## 2019-05-29 LAB
ALBUMIN SERPL-MCNC: 3.5 G/DL (ref 3.5–5.2)
ALP BLD-CCNC: 50 U/L (ref 35–104)
ALT SERPL-CCNC: 12 U/L (ref 0–32)
ANION GAP SERPL CALCULATED.3IONS-SCNC: 10 MMOL/L (ref 7–16)
AST SERPL-CCNC: 17 U/L (ref 0–31)
BILIRUB SERPL-MCNC: 0.3 MG/DL (ref 0–1.2)
BUN BLDV-MCNC: 12 MG/DL (ref 8–23)
C-REACTIVE PROTEIN: 8.4 MG/DL (ref 0–0.4)
CALCIUM SERPL-MCNC: 9.3 MG/DL (ref 8.6–10.2)
CHLORIDE BLD-SCNC: 97 MMOL/L (ref 98–107)
CO2: 32 MMOL/L (ref 22–29)
CREAT SERPL-MCNC: 0.7 MG/DL (ref 0.5–1)
GFR AFRICAN AMERICAN: >60
GFR NON-AFRICAN AMERICAN: >60 ML/MIN/1.73
GLUCOSE BLD-MCNC: 151 MG/DL (ref 74–99)
MAGNESIUM: 2.1 MG/DL (ref 1.6–2.6)
POTASSIUM SERPL-SCNC: 3 MMOL/L (ref 3.5–5)
SEDIMENTATION RATE, ERYTHROCYTE: 85 MM/HR (ref 0–20)
SODIUM BLD-SCNC: 139 MMOL/L (ref 132–146)
TOTAL PROTEIN: 6.6 G/DL (ref 6.4–8.3)

## 2019-05-29 NOTE — TELEPHONE ENCOUNTER
Called and spoke with Elizabeth Hagan at [facility]. I stated that I had spoke with Dr. Libra Mcconnell today to discuss treatment plan and follow up appointment from Inpatient consult. Per Dr. Libra Mcconnell request, please schedule patient for Friday, June 7th. Informed Dr. Libra Mcconnell I am unable to authorize Flebogamma at this time as the patients IGg level are within normal limits. Dr. Libra Mcconnell verbalized understanding and state that she would not treat at this time. Yudelka Tellez stated that she would contact patient with appointment information.

## 2019-05-31 ENCOUNTER — OFFICE VISIT (OUTPATIENT)
Dept: PULMONOLOGY | Age: 79
End: 2019-05-31
Payer: MEDICARE

## 2019-05-31 ENCOUNTER — TELEPHONE (OUTPATIENT)
Dept: ONCOLOGY | Age: 79
End: 2019-05-31

## 2019-05-31 VITALS
OXYGEN SATURATION: 82 % | SYSTOLIC BLOOD PRESSURE: 126 MMHG | BODY MASS INDEX: 24.35 KG/M2 | WEIGHT: 124 LBS | DIASTOLIC BLOOD PRESSURE: 74 MMHG | RESPIRATION RATE: 18 BRPM | HEIGHT: 60 IN

## 2019-05-31 DIAGNOSIS — R06.00 DYSPNEA, UNSPECIFIED TYPE: ICD-10-CM

## 2019-05-31 DIAGNOSIS — I27.20 PULMONARY HYPERTENSION (HCC): Primary | ICD-10-CM

## 2019-05-31 DIAGNOSIS — J96.21 ACUTE ON CHRONIC RESPIRATORY FAILURE WITH HYPOXIA (HCC): ICD-10-CM

## 2019-05-31 DIAGNOSIS — J47.1 BRONCHIECTASIS WITH ACUTE EXACERBATION (HCC): ICD-10-CM

## 2019-05-31 PROCEDURE — 99213 OFFICE O/P EST LOW 20 MIN: CPT | Performed by: INTERNAL MEDICINE

## 2019-05-31 PROCEDURE — 99214 OFFICE O/P EST MOD 30 MIN: CPT | Performed by: INTERNAL MEDICINE

## 2019-05-31 RX ORDER — POTASSIUM CHLORIDE 750 MG/1
20 TABLET, EXTENDED RELEASE ORAL 2 TIMES DAILY
Qty: 30 TABLET | Refills: 0 | Status: ON HOLD | OUTPATIENT
Start: 2019-05-31 | End: 2019-07-08

## 2019-05-31 RX ORDER — LEVALBUTEROL INHALATION SOLUTION 1.25 MG/3ML
1.25 SOLUTION RESPIRATORY (INHALATION) EVERY 6 HOURS PRN
Qty: 360 ML | Refills: 3 | Status: SHIPPED | OUTPATIENT
Start: 2019-05-31 | End: 2019-01-01 | Stop reason: SDUPTHER

## 2019-05-31 NOTE — TELEPHONE ENCOUNTER
Patient called and cancelled her New Consult 06/07/19. She is cancelling due to she is going to [facility] as an outpatient. Once she has that all straightened out she stated she will call our office to reschedule.

## 2019-05-31 NOTE — PROGRESS NOTES
Pt here to follow up post hospital stay, having shortness of breath, general malaise and fatigue and O2 sat drops into the 60%'s. Son and  here at visit and pt wearing portable oxygen at 4 Liters NC. Encouraged pt to breathe in/out a few times and saturation increased to 88% and wall oxygen utilized and increased to 90%. Pt is a mouth breather and when she breathes with mouth closed O2 sat increased to 90%. Referral per Dr. Bernadette Singh to [facility] New [facility] along with Referral to Pulmonary HTN Clinic per pt/family request. Pt given Xopenex treatment in office per Dr. Lucrecia Corado's orders; pt tolerated treatment well; reports feeling like she can breath much better. Scripts sent to pharmacy and pt advised to check with pharmacy later this afternoon for meds. Pt has nebulizer at home already so need to set up w/ DME. Son to  Disc with images to take to [facility] appt. Pt also instructed on use of Potassium bid x 2 days per Dr. Mina Corado's orders. O2 sat at 91% @ end of visit. Pt to notify RN of appointment date in [facility] to follow up with our office as needed. Referral called to [facility] and referral info given to office; office to call pt directly to yassine appointment for Pulmonary HTN/ILD doctors to coordinate visit(s).

## 2019-06-01 DIAGNOSIS — J32.9 SINUSITIS, UNSPECIFIED CHRONICITY, UNSPECIFIED LOCATION: Primary | ICD-10-CM

## 2019-06-01 NOTE — PROGRESS NOTES
Department of Internal Medicine                                    Division of Pulmonary, Critical Care & Sleep Medicine                                           Pulmonary [address] [facility]                       Pulmonary Clinic Consult     I had the pleasure of seeing  Alex Desai in the [facility] regarding their bronchiectasis and chronic sinusitis      Chief Complaint   Patient presents with    Follow-up     is feeling tired and her O2 sats drop     Shortness of Breath    Other     infectious dr said she has pulmonary hypertension    sinus drainage  Cough with green sputum    HISTORY OF PRESENT ILLNESS:    Alex Desai is a 77y.o. year old female extremely pleasant who started smoking at the age of 15 one pack daily for 30 year and then she quit. She had significant sinus problem for which she has been following follow-up last years and she had sinus surgery without significant improvement in her symptoms. 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 Had bilateral bronchiectasis and she had low immunoglobulin and it was thought that the reason for her bronchiectasis. She was treated with IVIG by Dr. Tonya Valerio for short term and had stopped for unclear reason ,  Had cultures of her sputum showing Pseudomonas and also she states she ma. She had also workup for autoimmune disease where she had been a negative rheumatoid factor negative with slight, Hypogammaglobulinemia, IgE level was normal and sed rate was 25 with CRP 5.5 . Patient was tried in tobramycin inhaler because she developed hoarseness after  the use. Seen by ENT without any significant issue in the vocal cord      The patient also uses Flonase for high sinusitis, seeing an daily basis    The patient may concern is her chronic sinusitis.   There is no significant shortness of breath but she has cough with green sputum on daily basis mainly in the morning estimated as 1 tablespoon there was no hemoptysis. No fever or chills. No weight loss.     She had bronchoscopy on March 2019 and shows a lot of thick yellow secretions and we did culture and washing and she grow staph and she grow pseudomonas as well ,she had cefepime and had Zosyn later on as she did not tolerate cefepime and then stopped    Had CTA chest in May 2019 and shows diffuse grown glass opacities ,noduliartities  And few area of  Honey combing vs cystic changes     She has 2 d echo which shows severe  PHTN     ALLERGIES:    Allergies   Allergen Reactions    Metoprolol Shortness Of Breath    Tetracyclines & Related Other (See Comments)     Large blotches and reddness and burning  Face and hands    Sulfa Antibiotics Itching       PAST MEDICAL HISTORY:       Diagnosis Date    Arthritis     Bronchiectasis ([address]     with SOB    Bronchiectasis ([address]     CAD (coronary artery disease)     (11/13/14)- saw Jesse Pena for yearly visit 3/2014    Cataract     bilaterally    Gastritis     GERD (gastroesophageal reflux disease)     Heart block 2004    2 heart stents inserted    Hyperlipidemia     denies     Prolonged emergence from general anesthesia     Thyroid disease     hypo    Vitamin D deficiency        MEDICATIONS:   Current Outpatient Medications   Medication Sig Dispense Refill    potassium chloride (KLOR-CON M) 10 MEQ extended release tablet Take 2 tablets by mouth 2 times daily for 2 days 30 tablet 0    levalbuterol (XOPENEX) 1.25 MG/3ML nebulizer solution Take 3 mLs by nebulization every 6 hours as needed for Wheezing or Shortness of Breath 360 mL 3    albuterol sulfate HFA (PROAIR HFA) 108 (90 Base) MCG/ACT inhaler Inhale 2 puffs into the lungs every 6 hours as needed for Wheezing 1 Inhaler 3    rosuvastatin (CRESTOR) 5 MG tablet Take 1 tablet by mouth daily Indications: 3 times a week 90 quittin.6   Smokeless Tobacco Never Used     TB :None  Pets :None  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   Birds :None     SURGICAL HISTORY:   Past Surgical History:   Procedure Laterality Date    BRONCHOSCOPY N/A 14    BRONCHOSCOPY BAL & BRUSHING    BRONCHOSCOPY N/A 3/13/2019    BRONCHOSCOPY ALVEOLAR LAVAGE performed by Socorro Sims MD at [facility]      CATARACT REMOVAL Bilateral 2006    COLONOSCOPY  2006    neg    COLONOSCOPY  10/23/2015    CORONARY ANGIOPLASTY WITH STENT PLACEMENT      2 stents    ENDOSCOPY, COLON, DIAGNOSTIC      ENDOSCOPY, COLON, DIAGNOSTIC  10/23/2015    HAND TENDON SURGERY  2003    left thumb    JOINT REPLACEMENT Right 2012    knee    KNEE ARTHROSCOPY  2005    right    KNEE SURGERY  2012    right knee replacement    NOSE SURGERY  2014    sinus    OTHER SURGICAL HISTORY Left     thumb arthritis surgery    PICC LINE INSERTION NURSE  2019         UPPER GASTROINTESTINAL ENDOSCOPY      neg       FAMILY HISTORY:   Lung cancer:None  DVT or PE :None     REVIEW OF SYSTEMS:  Constitutional: General health is good . There has been no weight changes. No fevers, fatigue or weakness. Head: Patient denies any history of trauma, convulsive disorder or syncope. Skin:  Patient denies history of changes in pigmentation, eruptions or pruritus. No easy bruising or bleeding. EENT: nasal congestion ,severe erythema   Cardiovascular:   SOB ,No edema ,No chest pain   Respiration: SOB ,no ALVAREZ   Gastrointestinal:No GI bleed ,no melena  ,no hematemesis    Musculoskeletal: no joint pain ,no swelling  Neurological:no , syncope. Denies twitching, convulsions, loss of consciousness or memory. Endocrine:  . No history of goiter, exophthalmos or dryness of skin. The patient has no history of diabetes. Hematopoietic:  No history of bleeding disorders or easy bruising.   Rheumatic:  No connective tissue disease history or polyarthritis/inflammatory joint disease. PHYSICAL EXAMINATION:  Vitals:    05/31/19 1115   BP: 126/74   Resp: 18   SpO2: (!) 82%     Constitutional: This is a well developed, well nourished 77y.o. year old female who is alert, oriented, cooperative and in no apparent distress. Head was normocephalic and atraumatic. EENT: Eyes were equal and reactive to light and accommodation. Extraocular muscles intact. No conjunctival injections. External canals are patent and no discharge was appreciated. Septum was midline, mucosa was without erythema, exudates or cobblestoning. No thrush was noted. Neck: Supple without thyromegaly. No elevated JVP. Trachea was midline. No carotid bruits were auscultated. Respiratory: rhonchi and  Crackles bibasilar     Cardiovascular: Regular without murmur, clicks, gallops or rubs. There is no left or right ventricular heave. Pulses:  Carotid, radial and femoral pulses were equally bilaterally. Abdomen: Slightly rounded and soft without organomegaly. No rebound, rigidity or guarding was appreciated. Pulmonary Rhonchi RLL   Lymphatic: No lymphadenopathy. Musculoskeletal: no Joint deformity     Extremities: no edema . no cyanosis   Skin:  Warm and dry. Good color, turgor and pigmentation. No lesions or scars. Neurological/Psychiatric: The patient's general behavior, level of consciousness, thought content and emotional status is normal.  Cranial nerves II-XII are intact. DATA: Spirometry done in the office today demonstrates an FVC 2.47 of liters which is  110   % of predicted with an FEV1 of 1.92 liters which is111 % of predicted. FEV1/FVC ratio is 78  %. Mid expiratory flow rates are 1.71 % of predicted. Maximum voluntary ventilation is normal at 89  liters per minute or 121 % of predicted. Flow volume loop shows no signs of intrathoracic or extrathoracic obstruction.      Impressions: Normal spirometry    IMPRESSION:    1-Bronchiectasis with staph and

## 2019-06-05 ENCOUNTER — TELEPHONE (OUTPATIENT)
Dept: PULMONOLOGY | Age: 79
End: 2019-06-05

## 2019-06-07 ENCOUNTER — HOSPITAL ENCOUNTER (OUTPATIENT)
Dept: INFUSION THERAPY | Age: 79
End: 2019-06-07

## 2019-06-07 RX ORDER — NADOLOL 20 MG/1
20 TABLET ORAL NIGHTLY
Qty: 90 TABLET | Refills: 3 | Status: SHIPPED | OUTPATIENT
Start: 2019-06-07 | End: 2019-01-01

## 2019-06-18 RX ORDER — AMLODIPINE BESYLATE 5 MG/1
TABLET ORAL
Qty: 90 TABLET | Refills: 3 | Status: SHIPPED | OUTPATIENT
Start: 2019-06-18 | End: 2019-01-01 | Stop reason: SINTOL

## 2019-06-24 NOTE — DISCHARGE SUMMARY
Physician Discharge Summary     Patient ID:  Moncho Lake  [id number]  77 y.o.  1940    Admit date: 5/14/2019    Discharge date and time: 5/19/2019  3:18 PM     Admission Diagnoses:   Patient Active Problem List   Diagnosis    GERD (gastroesophageal reflux disease)    CAD (coronary artery disease)    HTN (hypertension)    DJD (degenerative joint disease) of knee    Allergy to sulfa drugs    Allergy to tetracycline    Presence of drug coated stent in LAD coronary artery    Dyspnea    Interstitial lung disease (HCC)    Bronchiectasis with acute exacerbation (HCC)    Bronchiectasis ([address]    Moderate protein-calorie malnutrition ([address]    Humoral immunodeficiency ([address]    Nonfamilial hypogammaglobulinemia ([address]     Acute on chronic respiratory failure with hypoxia ([address]    Pure hypercholesterolemia    Pulmonary hypertension ([address]       Discharge Diagnoses:   Patient Active Problem List   Diagnosis    GERD (gastroesophageal reflux disease)    CAD (coronary artery disease)    HTN (hypertension)    DJD (degenerative joint disease) of knee    Allergy to sulfa drugs    Allergy to tetracycline    Presence of drug coated stent in LAD coronary artery    Dyspnea    Interstitial lung disease ([address]    Bronchiectasis with acute exacerbation ([address]    Bronchiectasis ([address]    Moderate protein-calorie malnutrition ([address]    Humoral immunodeficiency ([address]    Nonfamilial hypogammaglobulinemia ([address]     Acute on chronic respiratory failure with hypoxia ([address]    Pure hypercholesterolemia    Pulmonary hypertension ([address]       CBC with Differential:    Lab Results   Component Value Date    WBC 7.6 05/28/2019    RBC 3.67 05/28/2019    HGB 9.2 05/28/2019    HCT 30.2 05/28/2019     05/28/2019    MCV 82.3 05/28/2019    MCH 25.1 05/28/2019    MCHC 30.5 05/28/2019    RDW 14.0 05/28/2019    LYMPHOPCT 18.8 05/28/2019    MONOPCT 8.7 05/28/2019    MYELOPCT 1 08/08/2016    BASOPCT 1.1 05/28/2019 MONOSABS 0.66 05/28/2019    LYMPHSABS 1.43 05/28/2019    EOSABS 0.84 05/28/2019    BASOSABS 0.08 05/28/2019     CMP:    Lab Results   Component Value Date     05/28/2019    K 3.0 05/28/2019    CL 97 05/28/2019    CO2 32 05/28/2019    BUN 12 05/28/2019    CREATININE 0.7 05/28/2019    GFRAA >60 05/28/2019    LABGLOM >60 05/28/2019    GLUCOSE 151 05/28/2019    GLUCOSE 120 03/28/2011    PROT 6.6 05/28/2019    LABALBU 3.5 05/28/2019    LABALBU 4.1 03/28/2011    CALCIUM 9.3 05/28/2019    BILITOT 0.3 05/28/2019    ALKPHOS 50 05/28/2019    AST 17 05/28/2019    ALT 12 05/28/2019        Consults: cardiology and pulmonary/intensive care hematology oncology and infectious disease    Procedures: review hospital records    Hospital Course: 62-year-old female with multiple medical issues and admitted with increasing shortness of breath cough and congestion patient has known bronchiectasis. Patient was admitted with multiple consults as noted above during her hospital course she was started on intravenous antibiotics per infectious disease is scheduled to be discharged and was discharged on ongoing IV antibiotics per infectious disease  Her condition on discharge was stable  Discharge Exam:    /73   Pulse 57   Temp 98 °F (36.7 °C) (Temporal)   Resp 20   Ht 5' (1.524 m)   Wt 121 lb 6.4 oz (55.1 kg)   SpO2 93%   BMI 23.71 kg/m²     Lungs:  Breath sounds: rhonchi- throughout  Heart:  Heart regular rate and rhythm  Abdomen:  Soft, non-tender, normal bowel sounds. No bruits, organomegaly or masses. Extremities: Extremities warm to touch, pink, with no edema.     Disposition: home    Patient Instructions:   Discharge Medication List as of 5/19/2019  2:31 PM      START taking these medications    Details   mirtazapine (REMERON) 7.5 MG tablet Take 1 tablet by mouth nightly, Disp-30 tablet, R-3Normal      Respiratory Therapy Supplies (FULL KIT NEBULIZER SET) MISC Disp-1 each, R-0, PrintUse as directed with nebulized medication.       piperacillin-tazobactam (ZOSYN) 4.5 (4-0.5) g injection Infuse 4.5 g intravenously every 6 hours for 14 days, Disp-252 g, R-0Print         CONTINUE these medications which have CHANGED    Details   albuterol sulfate HFA (PROAIR HFA) 108 (90 Base) MCG/ACT inhaler Inhale 2 puffs into the lungs every 6 hours as needed for Wheezing, Disp-1 Inhaler, R-3Normal         CONTINUE these medications which have NOT CHANGED    Details   rosuvastatin (CRESTOR) 5 MG tablet Take 1 tablet by mouth daily Indications: 3 times a week, Disp-90 tablet, R-1Normal      hydrochlorothiazide (HYDRODIURIL) 12.5 MG tablet Take 1 tablet by mouth daily, Disp-90 tablet, R-1Normal      thyroid (ARMOUR THYROID) 60 MG tablet Take 1 tablet by mouth daily, Disp-90 tablet, R-1Normal      ranolazine (RANEXA) 500 MG extended release tablet Take 1 tablet by mouth 2 times daily, Disp-180 tablet, R-3Normal      nadolol (CORGARD) 20 MG tablet Take 1 tablet by mouth daily, Disp-30 tablet, R-3Normal      isosorbide mononitrate (IMDUR) 30 MG extended release tablet Take 1 tablet by mouth daily, Disp-90 tablet, R-3Normal      aspirin EC 81 MG EC tablet Take 1 tablet by mouth daily, Disp-30 tablet, R-3Normal      Probiotic Product (PROBIOTIC ADVANCED PO) Take by mouthHistorical Med      azelastine (ASTELIN) 0.1 % nasal spray 1 spray by Nasal route 2 times daily Use in each nostril as directed, Disp-1 Bottle, R-3Normal      amLODIPine (NORVASC) 5 MG tablet Take 1 tablet by mouth nightly, Disp-90 tablet, R-3Normal      nitroGLYCERIN (NITROSTAT) 0.4 MG SL tablet Place 1 tablet under the tongue every 5 minutes as needed for Chest pain (has seen PCP having halie last Nitro (10/2014)), Disp-25 tablet, R-3Generic is OKNormal      Magnesium 100 MG CAPS Take 250 mg by mouth daily Historical Med      Multiple Vitamins-Minerals (THERAPEUTIC MULTIVITAMIN-MINERALS) tablet Take 1 tablet by mouth daily      Cholecalciferol (VITAMIN D) 2000 UNITS CAPS capsule Take 10,000 capsules by mouth daily       Biotin 5000 MCG CAPS Take  by mouth daily. Coenzyme Q10 (CO Q 10 PO) Take 200 mg by mouth daily. OMEGA 3 1000 MG CAPS Take  by mouth daily. potassium chloride (KLOR-CON) 10 MEQ extended release tablet Take 10 mEq by mouth daily With Lasix as needed for swellingHistorical Med         STOP taking these medications       budesonide (PULMICORT) 0.25 MG/2ML nebulizer suspension Comments:   Reason for Stopping:         ipratropium-albuterol (DUONEB) 0.5-2.5 (3) MG/3ML SOLN nebulizer solution Comments:   Reason for Stopping:             Activity: activity as tolerated  Diet: cardiac diet    Follow-up with Abner Lora in 1-2 weeks.       Signed:  Victoriano Luther MD  6/24/2019  7:25 AM

## 2019-07-08 ENCOUNTER — APPOINTMENT (OUTPATIENT)
Dept: CT IMAGING | Age: 79
End: 2019-07-08
Payer: MEDICARE

## 2019-07-08 ENCOUNTER — HOSPITAL ENCOUNTER (OUTPATIENT)
Age: 79
Setting detail: OBSERVATION
Discharge: HOME | End: 2019-07-08
Attending: EMERGENCY MEDICINE | Admitting: INTERNAL MEDICINE
Payer: MEDICARE

## 2019-07-08 ENCOUNTER — APPOINTMENT (OUTPATIENT)
Dept: GENERAL RADIOLOGY | Age: 79
End: 2019-07-08
Payer: MEDICARE

## 2019-07-08 VITALS
RESPIRATION RATE: 16 BRPM | TEMPERATURE: 98.4 F | WEIGHT: 118 LBS | HEART RATE: 69 BPM | BODY MASS INDEX: 23.16 KG/M2 | SYSTOLIC BLOOD PRESSURE: 123 MMHG | DIASTOLIC BLOOD PRESSURE: 61 MMHG | OXYGEN SATURATION: 92 % | HEIGHT: 60 IN

## 2019-07-08 DIAGNOSIS — R07.9 CHEST PAIN, UNSPECIFIED TYPE: Primary | ICD-10-CM

## 2019-07-08 LAB
ALBUMIN SERPL-MCNC: 4.2 G/DL (ref 3.5–5.2)
ALP BLD-CCNC: 64 U/L (ref 35–104)
ALT SERPL-CCNC: 21 U/L (ref 0–32)
ANION GAP SERPL CALCULATED.3IONS-SCNC: 11 MMOL/L (ref 7–16)
APTT: 27.4 SEC (ref 24.5–35.1)
AST SERPL-CCNC: 21 U/L (ref 0–31)
BASOPHILS ABSOLUTE: 0.06 E9/L (ref 0–0.2)
BASOPHILS RELATIVE PERCENT: 0.5 % (ref 0–2)
BILIRUB SERPL-MCNC: 0.4 MG/DL (ref 0–1.2)
BILIRUBIN DIRECT: <0.2 MG/DL (ref 0–0.3)
BILIRUBIN, INDIRECT: NORMAL MG/DL (ref 0–1)
BUN BLDV-MCNC: 17 MG/DL (ref 8–23)
CALCIUM SERPL-MCNC: 9.5 MG/DL (ref 8.6–10.2)
CHLORIDE BLD-SCNC: 103 MMOL/L (ref 98–107)
CO2: 23 MMOL/L (ref 22–29)
CREAT SERPL-MCNC: 0.8 MG/DL (ref 0.5–1)
EOSINOPHILS ABSOLUTE: 0.32 E9/L (ref 0.05–0.5)
EOSINOPHILS RELATIVE PERCENT: 2.8 % (ref 0–6)
GFR AFRICAN AMERICAN: >60
GFR NON-AFRICAN AMERICAN: >60 ML/MIN/1.73
GLUCOSE BLD-MCNC: 113 MG/DL (ref 74–99)
HCT VFR BLD CALC: 33 % (ref 34–48)
HEMOGLOBIN: 10.6 G/DL (ref 11.5–15.5)
IMMATURE GRANULOCYTES #: 0.04 E9/L
IMMATURE GRANULOCYTES %: 0.4 % (ref 0–5)
INR BLD: 1
LYMPHOCYTES ABSOLUTE: 2.37 E9/L (ref 1.5–4)
LYMPHOCYTES RELATIVE PERCENT: 20.9 % (ref 20–42)
MCH RBC QN AUTO: 26.4 PG (ref 26–35)
MCHC RBC AUTO-ENTMCNC: 32.1 % (ref 32–34.5)
MCV RBC AUTO: 82.3 FL (ref 80–99.9)
MONOCYTES ABSOLUTE: 1.03 E9/L (ref 0.1–0.95)
MONOCYTES RELATIVE PERCENT: 9.1 % (ref 2–12)
NEUTROPHILS ABSOLUTE: 7.51 E9/L (ref 1.8–7.3)
NEUTROPHILS RELATIVE PERCENT: 66.3 % (ref 43–80)
PDW BLD-RTO: 15.2 FL (ref 11.5–15)
PLATELET # BLD: 176 E9/L (ref 130–450)
PMV BLD AUTO: 10.7 FL (ref 7–12)
POTASSIUM REFLEX MAGNESIUM: 4.6 MMOL/L (ref 3.5–5)
PRO-BNP: 583 PG/ML (ref 0–450)
PROTHROMBIN TIME: 11.8 SEC (ref 9.3–12.4)
RBC # BLD: 4.01 E12/L (ref 3.5–5.5)
SODIUM BLD-SCNC: 137 MMOL/L (ref 132–146)
TOTAL PROTEIN: 7.4 G/DL (ref 6.4–8.3)
TROPONIN: <0.01 NG/ML (ref 0–0.03)
TROPONIN: <0.01 NG/ML (ref 0–0.03)
WBC # BLD: 11.3 E9/L (ref 4.5–11.5)

## 2019-07-08 PROCEDURE — 6370000000 HC RX 637 (ALT 250 FOR IP): Performed by: INTERNAL MEDICINE

## 2019-07-08 PROCEDURE — 93005 ELECTROCARDIOGRAM TRACING: CPT | Performed by: EMERGENCY MEDICINE

## 2019-07-08 PROCEDURE — 6370000000 HC RX 637 (ALT 250 FOR IP): Performed by: EMERGENCY MEDICINE

## 2019-07-08 PROCEDURE — 99214 OFFICE O/P EST MOD 30 MIN: CPT | Performed by: INTERNAL MEDICINE

## 2019-07-08 PROCEDURE — 83880 ASSAY OF NATRIURETIC PEPTIDE: CPT

## 2019-07-08 PROCEDURE — G0378 HOSPITAL OBSERVATION PER HR: HCPCS

## 2019-07-08 PROCEDURE — 84484 ASSAY OF TROPONIN QUANT: CPT

## 2019-07-08 PROCEDURE — 36415 COLL VENOUS BLD VENIPUNCTURE: CPT

## 2019-07-08 PROCEDURE — 85025 COMPLETE CBC W/AUTO DIFF WBC: CPT

## 2019-07-08 PROCEDURE — 99285 EMERGENCY DEPT VISIT HI MDM: CPT

## 2019-07-08 PROCEDURE — 6360000002 HC RX W HCPCS: Performed by: INTERNAL MEDICINE

## 2019-07-08 PROCEDURE — 80076 HEPATIC FUNCTION PANEL: CPT

## 2019-07-08 PROCEDURE — 85730 THROMBOPLASTIN TIME PARTIAL: CPT

## 2019-07-08 PROCEDURE — 85610 PROTHROMBIN TIME: CPT

## 2019-07-08 PROCEDURE — 96372 THER/PROPH/DIAG INJ SC/IM: CPT

## 2019-07-08 PROCEDURE — 2700000000 HC OXYGEN THERAPY PER DAY

## 2019-07-08 PROCEDURE — 71045 X-RAY EXAM CHEST 1 VIEW: CPT

## 2019-07-08 PROCEDURE — 2580000003 HC RX 258: Performed by: INTERNAL MEDICINE

## 2019-07-08 PROCEDURE — 71250 CT THORAX DX C-: CPT

## 2019-07-08 PROCEDURE — 80048 BASIC METABOLIC PNL TOTAL CA: CPT

## 2019-07-08 PROCEDURE — 84145 PROCALCITONIN (PCT): CPT

## 2019-07-08 RX ORDER — AMLODIPINE BESYLATE 5 MG/1
5 TABLET ORAL DAILY
Status: DISCONTINUED | OUTPATIENT
Start: 2019-07-08 | End: 2019-07-08 | Stop reason: HOSPADM

## 2019-07-08 RX ORDER — TRAMADOL HYDROCHLORIDE 50 MG/1
50 TABLET ORAL ONCE
Status: COMPLETED | OUTPATIENT
Start: 2019-07-08 | End: 2019-07-08

## 2019-07-08 RX ORDER — NITROGLYCERIN 0.4 MG/1
0.4 TABLET SUBLINGUAL ONCE
Status: COMPLETED | OUTPATIENT
Start: 2019-07-08 | End: 2019-07-08

## 2019-07-08 RX ORDER — ROSUVASTATIN CALCIUM 5 MG/1
5 TABLET, COATED ORAL DAILY
Status: DISCONTINUED | OUTPATIENT
Start: 2019-07-08 | End: 2019-07-08 | Stop reason: HOSPADM

## 2019-07-08 RX ORDER — ISOSORBIDE MONONITRATE 30 MG/1
30 TABLET, EXTENDED RELEASE ORAL DAILY
Status: DISCONTINUED | OUTPATIENT
Start: 2019-07-08 | End: 2019-07-08 | Stop reason: HOSPADM

## 2019-07-08 RX ORDER — RANOLAZINE 500 MG/1
500 TABLET, EXTENDED RELEASE ORAL 2 TIMES DAILY
Status: DISCONTINUED | OUTPATIENT
Start: 2019-07-08 | End: 2019-07-08 | Stop reason: HOSPADM

## 2019-07-08 RX ORDER — ACETAMINOPHEN 325 MG/1
650 TABLET ORAL EVERY 6 HOURS PRN
Status: DISCONTINUED | OUTPATIENT
Start: 2019-07-08 | End: 2019-07-08 | Stop reason: HOSPADM

## 2019-07-08 RX ORDER — SODIUM CHLORIDE 0.9 % (FLUSH) 0.9 %
10 SYRINGE (ML) INJECTION EVERY 12 HOURS SCHEDULED
Status: DISCONTINUED | OUTPATIENT
Start: 2019-07-08 | End: 2019-07-08 | Stop reason: HOSPADM

## 2019-07-08 RX ORDER — ONDANSETRON 2 MG/ML
4 INJECTION INTRAMUSCULAR; INTRAVENOUS EVERY 6 HOURS PRN
Status: DISCONTINUED | OUTPATIENT
Start: 2019-07-08 | End: 2019-07-08 | Stop reason: HOSPADM

## 2019-07-08 RX ORDER — HYDROCHLOROTHIAZIDE 12.5 MG/1
12.5 TABLET ORAL DAILY
Status: DISCONTINUED | OUTPATIENT
Start: 2019-07-08 | End: 2019-07-08 | Stop reason: HOSPADM

## 2019-07-08 RX ORDER — ASPIRIN 81 MG/1
81 TABLET, CHEWABLE ORAL DAILY
Status: DISCONTINUED | OUTPATIENT
Start: 2019-07-09 | End: 2019-07-08 | Stop reason: HOSPADM

## 2019-07-08 RX ORDER — NADOLOL 20 MG/1
20 TABLET ORAL NIGHTLY
Status: DISCONTINUED | OUTPATIENT
Start: 2019-07-08 | End: 2019-07-08 | Stop reason: HOSPADM

## 2019-07-08 RX ORDER — SODIUM CHLORIDE 0.9 % (FLUSH) 0.9 %
10 SYRINGE (ML) INJECTION PRN
Status: DISCONTINUED | OUTPATIENT
Start: 2019-07-08 | End: 2019-07-08 | Stop reason: HOSPADM

## 2019-07-08 RX ORDER — LEVOTHYROXINE AND LIOTHYRONINE 19; 4.5 UG/1; UG/1
60 TABLET ORAL DAILY
Status: DISCONTINUED | OUTPATIENT
Start: 2019-07-08 | End: 2019-07-08 | Stop reason: HOSPADM

## 2019-07-08 RX ADMIN — NITROGLYCERIN 0.4 MG: 0.4 TABLET, ORALLY DISINTEGRATING SUBLINGUAL at 03:26

## 2019-07-08 RX ADMIN — TRAMADOL HYDROCHLORIDE 50 MG: 50 TABLET, FILM COATED ORAL at 15:55

## 2019-07-08 RX ADMIN — ROSUVASTATIN CALCIUM 5 MG: 5 TABLET, FILM COATED ORAL at 08:45

## 2019-07-08 RX ADMIN — NITROGLYCERIN 1 INCH: 20 OINTMENT TOPICAL at 06:11

## 2019-07-08 RX ADMIN — RANOLAZINE 500 MG: 500 TABLET, FILM COATED, EXTENDED RELEASE ORAL at 08:45

## 2019-07-08 RX ADMIN — AMLODIPINE BESYLATE 5 MG: 5 TABLET ORAL at 08:45

## 2019-07-08 RX ADMIN — LEVOTHYROXINE, LIOTHYRONINE 60 MG: 19; 4.5 TABLET ORAL at 08:45

## 2019-07-08 RX ADMIN — Medication 10 ML: at 08:46

## 2019-07-08 RX ADMIN — ACETAMINOPHEN 650 MG: 325 TABLET ORAL at 07:40

## 2019-07-08 RX ADMIN — HYDROCHLOROTHIAZIDE 12.5 MG: 12.5 TABLET ORAL at 08:45

## 2019-07-08 RX ADMIN — ENOXAPARIN SODIUM 40 MG: 40 INJECTION SUBCUTANEOUS at 08:46

## 2019-07-08 ASSESSMENT — PAIN DESCRIPTION - PAIN TYPE
TYPE: ACUTE PAIN

## 2019-07-08 ASSESSMENT — PAIN DESCRIPTION - PROGRESSION
CLINICAL_PROGRESSION: NOT CHANGED
CLINICAL_PROGRESSION: GRADUALLY WORSENING
CLINICAL_PROGRESSION: NOT CHANGED
CLINICAL_PROGRESSION: NOT CHANGED

## 2019-07-08 ASSESSMENT — PAIN DESCRIPTION - ORIENTATION
ORIENTATION: RIGHT;LEFT;ANTERIOR;POSTERIOR
ORIENTATION: POSTERIOR;RIGHT;LEFT;MID
ORIENTATION: RIGHT;LEFT;ANTERIOR;POSTERIOR

## 2019-07-08 ASSESSMENT — PAIN SCALES - GENERAL
PAINLEVEL_OUTOF10: 4
PAINLEVEL_OUTOF10: 6
PAINLEVEL_OUTOF10: 6
PAINLEVEL_OUTOF10: 4
PAINLEVEL_OUTOF10: 8
PAINLEVEL_OUTOF10: 4

## 2019-07-08 ASSESSMENT — PAIN DESCRIPTION - LOCATION
LOCATION: CHEST;SHOULDER;BACK
LOCATION: CHEST;BACK;SHOULDER
LOCATION: CHEST;BACK;SHOULDER

## 2019-07-08 ASSESSMENT — PAIN DESCRIPTION - DESCRIPTORS
DESCRIPTORS: DISCOMFORT;STABBING
DESCRIPTORS: STABBING;DISCOMFORT
DESCRIPTORS: SHARP;SHOOTING;STABBING

## 2019-07-08 ASSESSMENT — PAIN DESCRIPTION - ONSET
ONSET: ON-GOING

## 2019-07-08 ASSESSMENT — PAIN - FUNCTIONAL ASSESSMENT
PAIN_FUNCTIONAL_ASSESSMENT: PREVENTS OR INTERFERES SOME ACTIVE ACTIVITIES AND ADLS

## 2019-07-08 ASSESSMENT — PAIN DESCRIPTION - FREQUENCY
FREQUENCY: CONTINUOUS

## 2019-07-08 NOTE — PROGRESS NOTES
Dr. Isidro Baxter out [facility] states Dr. Alejandra Newman is covering his patients. Will change consult to Dr. Alejandra Newman.

## 2019-07-08 NOTE — PROGRESS NOTES
Patient back to baseline 2L on NC. Discharge instructions reviewed with patient and . Patient instructed to take NSAID's for pain management as per pulmonary's recommendations.   Carrie Roque

## 2019-07-08 NOTE — CONSULTS
cardiac mediastinal silhouette with central pulmonary vascular congestion and thoracic aortic vascular calcifications, left midlung and left lung base airspace disease, nonspecific findings suspicious for multifocal infiltrate/pneumonia. EKG: SB with 1st degree AV block, NSSTT changes, rate 59 bpm. Patient is currently sitting up in bed. She continues to have diffuse chest discomfort, worsened with taking in a deep breath, constant for >13 hours. VSS. Please note: past medical records were reviewed per electronic medical record (EMR) - see detailed reports under Past Medical/ Surgical History. Past Medical History:    1. HLD: on statin therapy   2. 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 CAD:  · History of CHELSEA to LAD x 2 (2003). · Cardiac cath (2005) s/p CHELSEA to LAD. · Cardiac cath (10/2008) showing Coronary angiography was performed today which revealed minimal coronary artery disease and widely patent stents in the mid LAD.  An atrial branch was not visualized from the LCX, but non-selective injection of the RCC did not reveal an anomalous LCX. Given the widely patent stents and lack of other obstructive coronary artery disease, her symptoms could be due to microvascular disease. [facility] started her on imipramine 50 mg QHS in the hope to provide symptomatic relief. -Following cath developed a right femoral artery thrombus s/p right femoral artery embolectomy. · Stress test: [facility] -1/29/18: ECG during the infusion did not change,The myocardial perfusion imaging was normal. Overall left ventricular systolic function was normal without regional wall motion abnormalities. Low risk general pharmacologic nuclear stress test.  · 4. TTE-1/29/2018: LVEF 55%, stage II diastolic dysfunction, mild MR, moderate TR, RVSP 35 mmHg with a normal PA systolic pressure. 5. TTE-1/29/2018: LVEF 55%, stage II diastolic dysfunction, mild MR, moderate TR, RVSP 35 mmHg wi  6.  TTE - 5/17/2019 (Dr. Angie Grijalva):  Ejection fraction is Yes Shahram Mcdonald MD   levalbuterol [place]) 1.25 MG/3ML nebulizer solution Take 3 mLs by nebulization every 6 hours as needed for Wheezing or Shortness of Breath 5/31/19  Yes Stefan Aguilar MD   albuterol sulfate HFA (PROAIR HFA) 108 (90 Base) MCG/ACT inhaler Inhale 2 puffs into the lungs every 6 hours as needed for Wheezing 5/19/19  Yes Laurie Noel MD   rosuvastatin (CRESTOR) 5 MG tablet Take 1 tablet by mouth daily Indications: 3 times a week 4/24/19  Yes Laurie Noel MD   thyroid (ARMOUR THYROID) 60 MG tablet Take 1 tablet by mouth daily 4/24/19  Yes Laurie Noel MD   ranolazine (RANEXA) 500 MG extended release tablet Take 1 tablet by mouth 2 times daily 1/31/19  Yes Shahram Mcdonald MD   isosorbide mononitrate (IMDUR) 30 MG extended release tablet Take 1 tablet by mouth daily 11/29/18  Yes Shahram Mcdonald MD   aspirin EC 81 MG EC tablet Take 1 tablet by mouth daily 10/30/18  Yes Shahram Mcdonald MD   Probiotic Product (PROBIOTIC ADVANCED PO) Take by mouth   Yes Historical Provider, MD   azelastine (ASTELIN) 0.1 % nasal spray 1 spray by Nasal route 2 times daily Use in each nostril as directed 10/9/18  Yes Roxie Vaughan DO   nitroGLYCERIN (NITROSTAT) 0.4 MG SL tablet Place 1 tablet under the tongue every 5 minutes as needed for Chest pain (has seen PCP having usig last Nitro (10/2014)) 1/17/18  Yes Shahram Mcdonald MD   Magnesium 100 MG CAPS Take 250 mg by mouth daily    Yes Historical Provider, MD   Multiple Vitamins-Minerals (THERAPEUTIC MULTIVITAMIN-MINERALS) tablet Take 1 tablet by mouth daily   Yes Historical Provider, MD   Biotin 5000 MCG CAPS Take  by mouth daily. Yes Historical Provider, MD   mirtazapine (REMERON) 7.5 MG tablet Take 1 tablet by mouth nightly 5/19/19   Laurie Noel MD   Respiratory Therapy Supplies (FULL KIT NEBULIZER SET) MISC Use as directed with nebulized medication.  5/19/19   Abner Lora MD       Current Medications: Current Facility-Administered Medications: amLODIPine (NORVASC) tablet 5 mg, 5 mg, Oral, Daily  hydrochlorothiazide (HYDRODIURIL) tablet 12.5 mg, 12.5 mg, Oral, Daily  isosorbide mononitrate (IMDUR) extended release tablet 30 mg, 30 mg, Oral, Daily  nadolol (CORGARD) tablet 20 mg, 20 mg, Oral, Nightly  ranolazine (RANEXA) extended release tablet 500 mg, 500 mg, Oral, BID  rosuvastatin (CRESTOR) tablet 5 mg, 5 mg, Oral, Daily  thyroid (ARMOUR) tablet 60 mg, 60 mg, Oral, Daily  sodium chloride flush 0.9 % injection 10 mL, 10 mL, Intravenous, 2 times per day  sodium chloride flush 0.9 % injection 10 mL, 10 mL, Intravenous, PRN  magnesium hydroxide (MILK OF MAGNESIA) 400 MG/5ML suspension 30 mL, 30 mL, Oral, Daily PRN  ondansetron (ZOFRAN) injection 4 mg, 4 mg, Intravenous, Q6H PRN  [START ON 7/9/2019] aspirin chewable tablet 81 mg, 81 mg, Oral, Daily  enoxaparin (LOVENOX) injection 40 mg, 40 mg, Subcutaneous, Daily  nitroglycerin (NITRO-BID) 2 % ointment 1 inch, 1 inch, Topical, 4 times per day  acetaminophen (TYLENOL) tablet 650 mg, 650 mg, Oral, Q6H PRN    Allergies:  Tetracyclines & related and Sulfa antibiotics    Social History:    Patient lives with her . Denies using a walker or cane. Ex-smoker: quit in 1984; 25 pack years. Denies using alcohol and illicit drug use. Family History: Noncontributory due to advanced age. REVIEW OF SYSTEMS:     · Constitutional: + fatigue. Denies fevers, chills or night sweats  · Eyes: Denies visual changes or drainage  · ENT: Denies headaches or hearing loss. No mouth sores or sore throat. No epistaxis   · Cardiovascular: See HPI. No lower extremity swelling. · Respiratory: + Chronic ALVAREZ, + white productive cough, + orthopnea. Denies PND. No hemoptysis. +Chronic O2. · Gastrointestinal: Denies hematemesis or anorexia. No hematochezia or melena    · Genitourinary: Denies urgency, dysuria or hematuria.   · Musculoskeletal: Denies gait disturbance, weakness or per session: Not on file    Stress: Not on file   Relationships    Social connections:       Talks on phone: Not on file       Gets together: Not on file       Attends Anabaptism service: Not on file       Active member of club or organization: Not on file       Attends meetings of clubs or organizations: Not on file       Relationship status: Not on file    Intimate partner violence:       Fear of current or ex partner: Not on file       Emotionally abused: Not on file       Physically abused: Not on file       Forced sexual activity: Not on file   Other Topics Concern    Not on file   Social History Narrative    Not on file            Family History         Family History   Problem Relation Age of Onset    Cancer Paternal Grandfather           stomach    Diabetes Maternal Grandmother      Other Father           no clinical problems dies at age 80 years   [facility] Stroke Mother      Osteoporosis Mother      Alzheimer's Disease Brother           in nursing home            ROS:   General: No unusual weight gain, no change in exercise tolerance  Skin: No rash or itching  EENT: No vision changes or nosebleeds  Cardiovascular: No orthopnea or paroxysmal nocturnal dyspnea  Respiratory: No cough or hemoptysis  Gastrointestinal: No hematemesis or recent changes in bowel habits  Genitourinary: No hematuria, urgency or frequency  Musculoskeletal: No muscular weakness or joint swelling   Neurologic / Psychiatric: No incoordination or convulsions  Allergic / Immunologic/ Lymphatic / Endocrine: No anemia or bleeding tendency     Physical Exam:   Vitals          Vitals:     07/08/19 0554 07/08/19 0559 07/08/19 0616 07/08/19 0740   BP:   134/79 134/79 120/69   Pulse:   72 72 68   Resp:   18   18   Temp:   97.9 °F (36.6 °C)   98.1 °F (36.7 °C)   TempSrc:   Oral   Oral   SpO2:   96%   95%   Weight: 118 lb 14.4 oz (53.9 kg) 118 lb (53.5 kg)       Height:   5' (1.524 m)             HEAD & FACE: Normocephalic. Symmetric.   EYES: No

## 2019-07-08 NOTE — CARE COORDINATION
7/8/2019  Social Work Discharge Planning:  SW was informed by Colin Thomas with [facility] that Pt is on 2l home o2 through them. Pt is currently on 3l here. If Pt cannot be weaned, then new orders will need to be faxed to [phone]. Electronically signed by FIOR Cruz on 7/8/2019 at 1:36 PM

## 2019-07-08 NOTE — ED PROVIDER NOTES
without rash  Neurologic: GCS 15,  Psych: Normal Affect      ------------------------------ ED COURSE/MEDICAL DECISION MAKING----------------------  Medications   amLODIPine (NORVASC) tablet 5 mg (has no administration in time range)   hydrochlorothiazide (HYDRODIURIL) tablet 12.5 mg (has no administration in time range)   isosorbide mononitrate (IMDUR) extended release tablet 30 mg (has no administration in time range)   nadolol (CORGARD) tablet 20 mg (has no administration in time range)   ranolazine (RANEXA) extended release tablet 500 mg (has no administration in time range)   rosuvastatin (CRESTOR) tablet 5 mg (has no administration in time range)   thyroid (ARMOUR) tablet 60 mg (has no administration in time range)   sodium chloride flush 0.9 % injection 10 mL (has no administration in time range)   sodium chloride flush 0.9 % injection 10 mL (has no administration in time range)   magnesium hydroxide (MILK OF MAGNESIA) 400 MG/5ML suspension 30 mL (has no administration in time range)   ondansetron (ZOFRAN) injection 4 mg (has no administration in time range)   aspirin chewable tablet 81 mg (has no administration in time range)   enoxaparin (LOVENOX) injection 40 mg (has no administration in time range)   nitroglycerin (NITRO-BID) 2 % ointment 1 inch (1 inch Topical Given 7/8/19 0611)   nitroGLYCERIN (NITROSTAT) SL tablet 0.4 mg (0.4 mg Sublingual Given 7/8/19 0326)     EKG: This EKG is signed and interpreted by me. Time:0302  Rate: 59  Rhythm: Sinus  Interpretation: 1st degree AV block  Comparison: stable as compared to patient's most recent EKG      ED COURSE:       Medical Decision Making:    Pt presented with [facility], her ecg was stable, trop negative, case d/w dr Deepa Zhong who will admit  Risks and benefits were discussed with patient for All medications dispensed and given in department as well prescriptions prescribed for home, . The patient elected to take the medicine.  Pt instructed on warning signs and precautions for medication side effects. The patient was given warning signs for when to seek medical attention. Counseling: The emergency provider has spoken with the patient and discussed todays results, in addition to providing specific details for the plan of care and counseling regarding the diagnosis and prognosis. Questions are answered at this time and they are agreeable with the plan.      --------------------------------- IMPRESSION AND DISPOSITION ---------------------------------    IMPRESSION  1. Chest pain, unspecified type        DISPOSITION  Disposition: Admit to telemetry  Patient condition is fair      NOTE: This report was transcribed using voice recognition software.  Every effort was made to ensure accuracy; however, inadvertent computerized transcription errors may be present         Aden Sullivan DO  07/08/19 0710

## 2019-07-08 NOTE — CONSULTS
[facility]  [address], [address]  [facility], [facility]    Pulmonary Consultation      Reason for Consult:  Chest pain    Requesting Physician:  Alec Patel MD    CHIEF COMPLAINT:  Chest pain    History Obtained From:  patient    HISTORY OF PRESENT ILLNESS:                Pt being seen for Dr Johnny Rodrigez   The patient is a 77 y.o. female with significant past medical history of chronic bronchiectasis who presents with chest pain on day of admission. Claims was doing ok, using oxygen continuously and on albuterol and hypertonic saline nebs for bronchiectasis until last night when developed pain at chest and left shoulder, running down her arm and going back up to chest and back with associated pain with deep breathing. Denies any fever/cough/worse nasal congestion. Took NTG, didn't help, came here for further evaluation. Currently feeling slightly better with pain though still present, still no change with respiratory function and denies cough/congestion, on NC at 4 li (usually on 2 li and increases to 3-4 li with physical activity). Doesn't remember any significant physical activity prior to chest pain. Being seen at [facility] due to pulmonary HTN, mention of hypoxia and chronic bronchiectasis and not on chronic antibiotic therapy, just nebs as mentioned above and bactroban nasal saline for chronic sinusitis. Compliant with oxygen use as out-pt.     Past Medical History:        Diagnosis Date    Arthritis     Bronchiectasis ([address]     with SOB    Bronchiectasis ([address]     CAD (coronary artery disease)     (11/13/14)- saw Cyn Valle for yearly visit 3/2014    Cataract     bilaterally    Gastritis     GERD (gastroesophageal reflux disease)     Heart block 2004    2 heart stents inserted    Hyperlipidemia     denies     Prolonged emergence from general anesthesia     Thyroid disease     hypo    Vitamin D deficiency        Past

## 2019-07-08 NOTE — PROGRESS NOTES
Consult information sent to Dr. Brigitte Snow.  Electronically signed by Hayder Zimmerman RN on 7/8/2019 at 9:15 AM

## 2019-07-08 NOTE — PROGRESS NOTES
Dr. Moncho Coburn notified regarding chest xray results and patient complaining of 6/10 chest pain with no relief from nitro paste that was ordered.

## 2019-07-09 LAB
EKG ATRIAL RATE: 59 BPM
EKG P AXIS: 49 DEGREES
EKG P-R INTERVAL: 210 MS
EKG Q-T INTERVAL: 428 MS
EKG QRS DURATION: 74 MS
EKG QTC CALCULATION (BAZETT): 423 MS
EKG R AXIS: 17 DEGREES
EKG T AXIS: 29 DEGREES
EKG VENTRICULAR RATE: 59 BPM
PROCALCITONIN: 0.02 NG/ML (ref 0–0.08)

## 2019-07-09 PROCEDURE — 93010 ELECTROCARDIOGRAM REPORT: CPT | Performed by: INTERNAL MEDICINE

## 2019-07-10 ENCOUNTER — TELEPHONE (OUTPATIENT)
Dept: CARDIOLOGY CLINIC | Age: 79
End: 2019-07-10

## 2019-07-30 NOTE — TELEPHONE ENCOUNTER
Called patient to inform that I spoke with [facility] and they will supply her with portable O2 tanks for her trip to [place]. All she has to do is call [facility] two (2) weeks prior to her trip so that they can get it ready.  Patient verbalized understanding and stated that she will call them

## 2019-07-30 NOTE — TELEPHONE ENCOUNTER
Pt calling requesting office to help arrange a portable oxygen concentrator to allow her to travel via airplane to [place]. Current oxygen provided for pt thru [facility].  Advised pt that office will contact [facility] to determine how to provide pt wit device she needs for travel in Sept.

## 2019-08-07 NOTE — PATIENT INSTRUCTIONS
help certain people lower their risk of a heart attack or stroke. But taking aspirin isn't right for everyone, because it can cause serious bleeding. Do not start taking daily aspirin unless your doctor knows about it. How is coronary artery disease treated? · Your doctor will suggest that you make lifestyle changes. For example, your doctor may ask you to eat healthy foods, quit smoking, lose extra weight, and be more active. · You will have to take medicines. · Your doctor may suggest a procedure to open narrowed or blocked arteries. This is called angioplasty. Or your doctor may suggest using healthy blood vessels to create detours around narrowed or blocked arteries. This is called bypass surgery. Follow-up care is a key part of your treatment and safety. Be sure to make and go to all appointments, and call your doctor if you are having problems. It's also a good idea to know your test results and keep a list of the medicines you take. Where can you learn more? Go to https://[employer] org and sign in to your [employer] account. Enter [phone] in the [employer] box to learn more about \"Learning About Coronary Artery Disease (CAD). \"     If you do not have an account, please click on the \"Sign Up Now\" link. Current as of: July 22, 2018  Content Version: 12.0  © 2199-9154 Healthwise, Incorporated. Care instructions adapted under license by [facility] ([facility] If you have questions about a medical condition or this instruction, always ask your healthcare professional. [place] [address] any warranty or liability for your use of this information.

## 2019-10-20 NOTE — TELEPHONE ENCOUNTER
Call from pt to update Dr. Scar Crane that she has appointment in [place] on July 1st with specialist he had referred her to for Pulmonary issues; son to  CD of images at office  to take with her. Flor request RN have images available for son to  later this week in office. [employer] are here and available. Call to son Roya Esposito and informed images are available in office to .
Abdomen soft, nontender, nondistended, bowel sounds present in all 4 quadrants.

## 2020-01-01 ENCOUNTER — HOSPITAL ENCOUNTER (OUTPATIENT)
Dept: CARDIOLOGY | Age: 80
Discharge: HOME | End: 2020-07-01
Payer: MEDICARE

## 2020-01-01 ENCOUNTER — HOSPITAL ENCOUNTER (OUTPATIENT)
Dept: GENERAL RADIOLOGY | Age: 80
Discharge: HOME | End: 2020-03-12
Payer: MEDICARE

## 2020-01-01 ENCOUNTER — TELEPHONE (OUTPATIENT)
Dept: CARDIOLOGY CLINIC | Age: 80
End: 2020-01-01

## 2020-01-01 ENCOUNTER — VIRTUAL VISIT (OUTPATIENT)
Dept: CARDIOLOGY CLINIC | Age: 80
End: 2020-01-01
Payer: MEDICARE

## 2020-01-01 ENCOUNTER — TELEPHONE (OUTPATIENT)
Dept: CARDIOLOGY | Age: 80
End: 2020-01-01

## 2020-01-01 ENCOUNTER — HOSPITAL ENCOUNTER (EMERGENCY)
Age: 80
End: 2020-07-15
Attending: EMERGENCY MEDICINE
Payer: MEDICARE

## 2020-01-01 VITALS
OXYGEN SATURATION: 98 % | WEIGHT: 123 LBS | HEART RATE: 59 BPM | DIASTOLIC BLOOD PRESSURE: 60 MMHG | HEIGHT: 60 IN | BODY MASS INDEX: 24.15 KG/M2 | SYSTOLIC BLOOD PRESSURE: 100 MMHG

## 2020-01-01 VITALS
SYSTOLIC BLOOD PRESSURE: 112 MMHG | HEART RATE: 50 BPM | HEIGHT: 68 IN | DIASTOLIC BLOOD PRESSURE: 66 MMHG | WEIGHT: 122.5 LBS | BODY MASS INDEX: 18.57 KG/M2

## 2020-01-01 VITALS — WEIGHT: 120 LBS

## 2020-01-01 PROCEDURE — 99285 EMERGENCY DEPT VISIT HI MDM: CPT

## 2020-01-01 PROCEDURE — 93017 CV STRESS TEST TRACING ONLY: CPT

## 2020-01-01 PROCEDURE — A9500 TC99M SESTAMIBI: HCPCS | Performed by: INTERNAL MEDICINE

## 2020-01-01 PROCEDURE — 3430000000 HC RX DIAGNOSTIC RADIOPHARMACEUTICAL: Performed by: INTERNAL MEDICINE

## 2020-01-01 PROCEDURE — 99442 PR PHYS/QHP TELEPHONE EVALUATION 11-20 MIN: CPT | Performed by: INTERNAL MEDICINE

## 2020-01-01 PROCEDURE — 78452 HT MUSCLE IMAGE SPECT MULT: CPT

## 2020-01-01 PROCEDURE — 77063 BREAST TOMOSYNTHESIS BI: CPT

## 2020-01-01 PROCEDURE — 2580000003 HC RX 258: Performed by: INTERNAL MEDICINE

## 2020-01-01 PROCEDURE — 6360000002 HC RX W HCPCS: Performed by: INTERNAL MEDICINE

## 2020-01-01 PROCEDURE — 2500000003 HC RX 250 WO HCPCS

## 2020-01-01 PROCEDURE — 77080 DXA BONE DENSITY AXIAL: CPT

## 2020-01-01 PROCEDURE — 92950 HEART/LUNG RESUSCITATION CPR: CPT

## 2020-01-01 PROCEDURE — 6360000002 HC RX W HCPCS

## 2020-01-01 RX ORDER — RANOLAZINE 500 MG/1
500 TABLET, EXTENDED RELEASE ORAL 2 TIMES DAILY
Qty: 180 TABLET | Refills: 3 | Status: SHIPPED | OUTPATIENT
Start: 2020-01-01

## 2020-01-01 RX ORDER — NADOLOL 20 MG/1
20 TABLET ORAL DAILY
Qty: 90 TABLET | Refills: 3 | Status: SHIPPED | OUTPATIENT
Start: 2020-01-01

## 2020-01-01 RX ORDER — NITROGLYCERIN 0.4 MG/1
0.4 TABLET SUBLINGUAL EVERY 5 MIN PRN
Qty: 25 TABLET | Refills: 1 | Status: SHIPPED | OUTPATIENT
Start: 2020-01-01

## 2020-01-01 RX ORDER — SODIUM CHLORIDE 0.9 % (FLUSH) 0.9 %
10 SYRINGE (ML) INJECTION PRN
Status: DISCONTINUED | OUTPATIENT
Start: 2020-01-01 | End: 2020-01-01 | Stop reason: HOSPADM

## 2020-01-01 RX ADMIN — Medication 29.3 MILLICURIE: at 10:45

## 2020-01-01 RX ADMIN — Medication 10.3 MILLICURIE: at 08:32

## 2020-01-01 RX ADMIN — SODIUM CHLORIDE, PRESERVATIVE FREE 10 ML: 5 INJECTION INTRAVENOUS at 10:45

## 2020-01-01 RX ADMIN — SODIUM CHLORIDE, PRESERVATIVE FREE 10 ML: 5 INJECTION INTRAVENOUS at 10:46

## 2020-01-01 RX ADMIN — REGADENOSON 0.4 MG: 0.08 INJECTION, SOLUTION INTRAVENOUS at 10:45

## 2020-01-01 RX ADMIN — SODIUM CHLORIDE, PRESERVATIVE FREE 10 ML: 5 INJECTION INTRAVENOUS at 08:32

## 2020-04-01 NOTE — TELEPHONE ENCOUNTER
Due to the current COVID-19 pandemic, patient was offered virtual visit using a camera-enabled device (smart phone, tablet or computer) and reliable WiFi. Patient declined virtual video visit and requested a virtual phone call visit. Patient was notified that for purposes of billing, this is a virtual visit with their provider for which we will submit a claim for reimbursement with their insurance company. Patient was notified that they will be responsible for any copays, coinsurance amounts or other amounts not covered by their insurance company. Patient agreed to all of the above and is scheduled.

## 2020-07-01 NOTE — TELEPHONE ENCOUNTER
----- Message from Heraclio Thomas MD sent at 7/1/2020  2:15 PM EDT -----  Her stress test is normal.

## 2020-07-01 NOTE — TELEPHONE ENCOUNTER
Patient was notifed on normal stress test but she states she is sob and wants to know what to do. Please advise.

## 2020-07-01 NOTE — PROCEDURES
[address] [facility] [address] [phone]                Pharmacologic Stress Nuclear Gated SPECT Study    Name: [facility] Account Number: [de-identified]    :  1940          Sex: female         Date of Study:  2020    Height: 5' (152.4 cm)         Weight: 123 lb (55.8 kg)     Ordering Provider: Ingrid Mcguire MD          PCP: Eladio Pablo MD      Cardiologist: Ingrid Mcguire MD            Interpreting Physician: Moshe Bhandari MD  _________________________________________________________________________________    Indication:   Evaluation of extent and severity of coronary artery disease    Clinical History:   Patient has prior history of coronary artery disease. Resting ECG:    NV int .22 sec, QRS int .08 sec, QT int .42 sec; HR 47 bpm  Normal sinus rhythm, First degree AV block and Nonspecific ST-T wave changes    Procedure:   Pharmacologic stress testing was performed with regadenoson 0.4 mg for 15 seconds. The heart rate was 47 at baseline and beatrice to 69 beats during the infusion. This corresponds to 49% of maximum predicted heart rate. The blood pressure at baseline was 96/60 and blood pressure at the end of infusion was 92/60. Blood pressure response was normal during the stress procedure. The patient experienced shortness of breath during the infusion. ECG during the infusion did not change. IMAGING: Myocardial perfusion imaging was performed at rest 30-35 minutes following the intravenous injection of 10.3 mCi of (Tc-Sestamibi) followed by 10 ml of Normal Saline. As per infusion protocol, the patient was injected intravenously with 29.3 mCi of (Tc-Sestamibi) followed by 10 ml of Normal Saline. Gated post-stress tomographic imaging was performed 20-25 minutes after stress. FINDINGS: The overall quality of the study was good.      Left ventricular cavity size was noted to be normal.    Rotational analog analysis demonstrated no patient motion or abnormal extracardiac radioactivity. The gated SPECT stress imaging in the short, vertical long, and horizontal long axis demonstrated normal homogeneous tracer distribution throughout the myocardium. The resting images show no change. Gated SPECT left ventricular ejection fraction was calculated to be 71%, with normal myocardial thickening and wall motion. Impression:    1. ECG during the infusion did not change. 2. The myocardial perfusion imaging was normal.    3. Overall left ventricular systolic function was normal without regional wall motion abnormalities. 4. Low risk general pharmacologic stress test.    Thank you for sending your patient to this [employer] [facility].      Electronically signed by Maxx Weems MD on 7/1/20 at 12:56 PM EDT

## 2020-07-15 NOTE — ED NOTES
[place] [facility] [place] [facility]. Belongings remain with pt including gold chain (family did not take belongings).  Ice placed on eyes     Howie Lockhart RN  07/15/20 [id number]

## 2020-07-15 NOTE — ED PROVIDER NOTES
intubated; coarse breath sounds with scatted crackeles noted bilaterally. Abdominal:      General: There is no distension. Palpations: Abdomen is soft. There is no mass. Tenderness: There is no guarding. Musculoskeletal:         General: No deformity. Right lower leg: No edema. Left lower leg: No edema. Lymphadenopathy:      Cervical: No cervical adenopathy. Skin:     Capillary Refill: Capillary refill takes 2 to 3 seconds. Findings: No erythema or rash. Comments: Skin is cool and dry. Neurological:      Comments: Comprehensive neuro exam unable to be completed 2/2 to presenting condition          Procedures:  No procedures performed. --------------------------------------------- PAST HISTORY ---------------------------------------------  Past Medical History:  has a past medical history of Arthritis, Bronchiectasis ([facility] [address] Bronchiectasis ([facility] [address] CAD (coronary artery disease), Cataract, Gastritis, GERD (gastroesophageal reflux disease), Heart block, Hyperlipidemia, Prolonged emergence from general anesthesia, Thyroid disease, and Vitamin D deficiency. 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 Social History:  reports that she quit smoking about 36 years ago. Her smoking use included cigarettes. She started smoking about 63 years ago. She has a 25.00 pack-year smoking history.  She has never used smokeless tobacco. She reports that she does not drink alcohol or use drugs. Family History: family history includes Alzheimer's Disease in her brother; Cancer in her paternal grandfather; Diabetes in her maternal grandmother; Osteoporosis in her mother; Other in her father; Stroke in her mother. The patients home medications have been reviewed. Allergies: Tetracyclines & related and Sulfa antibiotics    -------------------------------------------------- RESULTS -------------------------------------------------    Labs:  No results found for this visit on 07/15/20. EKG: As interpreted by this ER physician  - No organized rhythm on the monitor.   - Rhythm strip showed asystole. Radiology  No orders to display         ------------------------- NURSING NOTES AND VITALS REVIEWED ---------------------------  Date / Time Roomed:  7/15/2020  9:25 AM  ED Bed Assignment:  SHERIF/SHERIF    The nursing notes within the ED encounter and vital signs as below have been reviewed. No data found. Oxygen Saturation Interpretation: Abnormal        --------------------------------- PROGRESS NOTES / ADDITIONAL PROVIDER NOTES ---------------------------------  Consultations:  As noted below. MDM/ED Course:      Patient in cardiac arrest on arrival, down for at least 40 min PTA. CPR continued for multiple rounds. Epi, bicarb and calcium given. No organized rhythm ever identified on the monitor. ROSC not achieved. On arrival:   Rhythm strip asystole   Intubated and being bagged with coarse breath sounds on auscultation   Absent heart sounds on auscultation  Absent pulse in all 4 extremities    Carotid pulses absent   Pupil reflexes absent   No gag reflex present   No movement to pain stimulation, no movement upon sternal rub   No movement with verbal stimulation    The patient was pronounced dead at 9:38 AM on 7/15/2020. UPDATE: 1017:  All results were discussed with the patient's  and I have provided specific details regarding the  patient's care. This patient was in cardiac arrest on arrival and ROSC was never achieved. Beatris Choe She was seen in the emergency department by myself and the assigned attending physician, Dr. Rissa Tavarez, who agreed with my assessment of the patient's condition and plan for CPR as laid out herein. The patient's  verbalized an understanding of the patient's presenting condition of cardiac arrest and was appreciative of our efforts to care for her. All questions were answered and the patient's family was allowed to see the patient. Clinical Impression  1.     2.  Cardiac arrest [facility])          Disposition  Patient's disposition:        Theresa Links, DO  Resident  [phone]
